# Patient Record
Sex: MALE | Race: WHITE | Employment: FULL TIME | URBAN - NONMETROPOLITAN AREA
[De-identification: names, ages, dates, MRNs, and addresses within clinical notes are randomized per-mention and may not be internally consistent; named-entity substitution may affect disease eponyms.]

---

## 2017-01-18 DIAGNOSIS — F17.200 SMOKER: Primary | ICD-10-CM

## 2017-01-18 RX ORDER — NICOTINE 21 MG/24HR
1 PATCH, TRANSDERMAL 24 HOURS TRANSDERMAL EVERY 24 HOURS
Qty: 30 PATCH | Refills: 1 | Status: SHIPPED | OUTPATIENT
Start: 2017-01-18 | End: 2017-11-09 | Stop reason: ALTCHOICE

## 2017-03-27 ENCOUNTER — TELEPHONE (OUTPATIENT)
Dept: FAMILY MEDICINE CLINIC | Age: 37
End: 2017-03-27

## 2017-03-28 ENCOUNTER — OFFICE VISIT (OUTPATIENT)
Dept: FAMILY MEDICINE CLINIC | Age: 37
End: 2017-03-28
Payer: COMMERCIAL

## 2017-03-28 VITALS
SYSTOLIC BLOOD PRESSURE: 120 MMHG | HEIGHT: 73 IN | HEART RATE: 60 BPM | BODY MASS INDEX: 23.99 KG/M2 | RESPIRATION RATE: 20 BRPM | WEIGHT: 181 LBS | DIASTOLIC BLOOD PRESSURE: 62 MMHG

## 2017-03-28 DIAGNOSIS — Z72.0 TOBACCO USE: Primary | ICD-10-CM

## 2017-03-28 DIAGNOSIS — E78.00 PURE HYPERCHOLESTEROLEMIA: ICD-10-CM

## 2017-03-28 DIAGNOSIS — K21.9 GASTROESOPHAGEAL REFLUX DISEASE WITHOUT ESOPHAGITIS: ICD-10-CM

## 2017-03-28 DIAGNOSIS — F41.8 ANXIETY ASSOCIATED WITH DEPRESSION: ICD-10-CM

## 2017-03-28 PROCEDURE — 99214 OFFICE O/P EST MOD 30 MIN: CPT | Performed by: PHYSICIAN ASSISTANT

## 2017-03-28 RX ORDER — NICOTINE 21 MG/24HR
1 PATCH, TRANSDERMAL 24 HOURS TRANSDERMAL EVERY 24 HOURS
Qty: 30 PATCH | Refills: 1 | Status: SHIPPED | OUTPATIENT
Start: 2017-03-28 | End: 2017-11-09 | Stop reason: ALTCHOICE

## 2017-03-28 ASSESSMENT — ENCOUNTER SYMPTOMS
COUGH: 0
RHINORRHEA: 1
GASTROINTESTINAL NEGATIVE: 1
SHORTNESS OF BREATH: 0

## 2017-03-31 DIAGNOSIS — E78.00 PURE HYPERCHOLESTEROLEMIA: ICD-10-CM

## 2017-04-04 RX ORDER — ATORVASTATIN CALCIUM 20 MG/1
20 TABLET, FILM COATED ORAL DAILY
Qty: 30 TABLET | Refills: 6 | Status: SHIPPED | OUTPATIENT
Start: 2017-04-04 | End: 2017-11-09 | Stop reason: SDUPTHER

## 2017-04-09 ASSESSMENT — ENCOUNTER SYMPTOMS
WHEEZING: 0
DIARRHEA: 0
CONSTIPATION: 0
CHEST TIGHTNESS: 0
TROUBLE SWALLOWING: 0
VOMITING: 0
SORE THROAT: 0
NAUSEA: 0

## 2017-04-29 DIAGNOSIS — K21.9 GASTROESOPHAGEAL REFLUX DISEASE WITHOUT ESOPHAGITIS: ICD-10-CM

## 2017-04-29 DIAGNOSIS — F41.8 DEPRESSION WITH ANXIETY: ICD-10-CM

## 2017-05-01 RX ORDER — OMEPRAZOLE 40 MG/1
CAPSULE, DELAYED RELEASE ORAL
Qty: 30 CAPSULE | Refills: 0 | Status: SHIPPED | OUTPATIENT
Start: 2017-05-01 | End: 2017-05-31 | Stop reason: SDUPTHER

## 2017-05-01 RX ORDER — ARIPIPRAZOLE 5 MG/1
TABLET ORAL
Qty: 30 TABLET | Refills: 0 | Status: SHIPPED | OUTPATIENT
Start: 2017-05-01 | End: 2017-05-31 | Stop reason: SDUPTHER

## 2017-05-31 DIAGNOSIS — K21.9 GASTROESOPHAGEAL REFLUX DISEASE WITHOUT ESOPHAGITIS: ICD-10-CM

## 2017-05-31 DIAGNOSIS — F41.8 DEPRESSION WITH ANXIETY: ICD-10-CM

## 2017-06-01 RX ORDER — OMEPRAZOLE 40 MG/1
CAPSULE, DELAYED RELEASE ORAL
Qty: 30 CAPSULE | Refills: 6 | Status: SHIPPED | OUTPATIENT
Start: 2017-06-01 | End: 2017-11-09 | Stop reason: SDUPTHER

## 2017-06-01 RX ORDER — ARIPIPRAZOLE 5 MG/1
TABLET ORAL
Qty: 30 TABLET | Refills: 6 | Status: SHIPPED | OUTPATIENT
Start: 2017-06-01 | End: 2017-11-09 | Stop reason: SDUPTHER

## 2017-06-30 ENCOUNTER — OFFICE VISIT (OUTPATIENT)
Dept: FAMILY MEDICINE CLINIC | Age: 37
End: 2017-06-30
Payer: COMMERCIAL

## 2017-06-30 VITALS
HEART RATE: 71 BPM | WEIGHT: 184 LBS | HEIGHT: 73 IN | OXYGEN SATURATION: 95 % | SYSTOLIC BLOOD PRESSURE: 112 MMHG | RESPIRATION RATE: 16 BRPM | DIASTOLIC BLOOD PRESSURE: 64 MMHG | BODY MASS INDEX: 24.39 KG/M2

## 2017-06-30 DIAGNOSIS — Z72.0 TOBACCO USE: Primary | ICD-10-CM

## 2017-06-30 DIAGNOSIS — B36.0 TINEA VERSICOLOR: ICD-10-CM

## 2017-06-30 PROCEDURE — 99213 OFFICE O/P EST LOW 20 MIN: CPT | Performed by: PHYSICIAN ASSISTANT

## 2017-06-30 RX ORDER — VARENICLINE TARTRATE 25 MG
KIT ORAL
Qty: 1 EACH | Refills: 0 | Status: SHIPPED | OUTPATIENT
Start: 2017-06-30 | End: 2017-11-09 | Stop reason: ALTCHOICE

## 2017-06-30 RX ORDER — TERBINAFINE HYDROCHLORIDE 250 MG/1
250 TABLET ORAL DAILY
Qty: 14 TABLET | Refills: 0 | Status: SHIPPED | OUTPATIENT
Start: 2017-06-30 | End: 2017-08-07 | Stop reason: SDUPTHER

## 2017-06-30 ASSESSMENT — PATIENT HEALTH QUESTIONNAIRE - PHQ9
2. FEELING DOWN, DEPRESSED OR HOPELESS: 0
SUM OF ALL RESPONSES TO PHQ9 QUESTIONS 1 & 2: 0
1. LITTLE INTEREST OR PLEASURE IN DOING THINGS: 0
SUM OF ALL RESPONSES TO PHQ QUESTIONS 1-9: 0

## 2017-07-02 ASSESSMENT — ENCOUNTER SYMPTOMS
COUGH: 0
SORE THROAT: 0
NAUSEA: 0
SHORTNESS OF BREATH: 0
WHEEZING: 0
TROUBLE SWALLOWING: 0
DIARRHEA: 0
VOMITING: 0
CHEST TIGHTNESS: 0

## 2017-08-01 DIAGNOSIS — B36.0 TINEA VERSICOLOR: ICD-10-CM

## 2017-08-07 DIAGNOSIS — B36.0 TINEA VERSICOLOR: ICD-10-CM

## 2017-08-07 RX ORDER — TERBINAFINE HYDROCHLORIDE 250 MG/1
250 TABLET ORAL DAILY
Qty: 14 TABLET | Refills: 0 | OUTPATIENT
Start: 2017-08-07

## 2017-08-07 RX ORDER — TERBINAFINE HYDROCHLORIDE 250 MG/1
250 TABLET ORAL DAILY
Qty: 14 TABLET | Refills: 0 | Status: SHIPPED | OUTPATIENT
Start: 2017-08-07 | End: 2017-11-09 | Stop reason: ALTCHOICE

## 2017-08-22 RX ORDER — VARENICLINE TARTRATE 1 MG/1
1 TABLET, FILM COATED ORAL 2 TIMES DAILY
Qty: 60 TABLET | Refills: 2 | Status: SHIPPED | OUTPATIENT
Start: 2017-08-22 | End: 2017-11-09 | Stop reason: ALTCHOICE

## 2017-11-09 ENCOUNTER — OFFICE VISIT (OUTPATIENT)
Dept: FAMILY MEDICINE CLINIC | Age: 37
End: 2017-11-09
Payer: COMMERCIAL

## 2017-11-09 VITALS
DIASTOLIC BLOOD PRESSURE: 70 MMHG | SYSTOLIC BLOOD PRESSURE: 100 MMHG | WEIGHT: 188 LBS | BODY MASS INDEX: 24.8 KG/M2 | HEART RATE: 81 BPM | OXYGEN SATURATION: 98 %

## 2017-11-09 DIAGNOSIS — Z72.0 TOBACCO USE: ICD-10-CM

## 2017-11-09 DIAGNOSIS — K21.9 GASTROESOPHAGEAL REFLUX DISEASE WITHOUT ESOPHAGITIS: ICD-10-CM

## 2017-11-09 DIAGNOSIS — F41.8 DEPRESSION WITH ANXIETY: ICD-10-CM

## 2017-11-09 DIAGNOSIS — E78.00 PURE HYPERCHOLESTEROLEMIA: ICD-10-CM

## 2017-11-09 DIAGNOSIS — B36.0 TINEA VERSICOLOR: Primary | ICD-10-CM

## 2017-11-09 PROCEDURE — 99214 OFFICE O/P EST MOD 30 MIN: CPT | Performed by: PHYSICIAN ASSISTANT

## 2017-11-09 RX ORDER — OMEPRAZOLE 40 MG/1
CAPSULE, DELAYED RELEASE ORAL
Qty: 30 CAPSULE | Refills: 6 | Status: SHIPPED | OUTPATIENT
Start: 2017-11-09 | End: 2019-02-08 | Stop reason: SDUPTHER

## 2017-11-09 RX ORDER — ARIPIPRAZOLE 5 MG/1
TABLET ORAL
Qty: 30 TABLET | Refills: 6 | Status: SHIPPED | OUTPATIENT
Start: 2017-11-09 | End: 2019-12-27

## 2017-11-09 RX ORDER — ATORVASTATIN CALCIUM 20 MG/1
20 TABLET, FILM COATED ORAL DAILY
Qty: 30 TABLET | Refills: 6 | Status: SHIPPED | OUTPATIENT
Start: 2017-11-09 | End: 2018-05-11 | Stop reason: SDUPTHER

## 2017-11-09 RX ORDER — BUPROPION HYDROCHLORIDE 150 MG/1
150 TABLET, EXTENDED RELEASE ORAL 2 TIMES DAILY
Qty: 60 TABLET | Refills: 3 | Status: SHIPPED | OUTPATIENT
Start: 2017-11-09 | End: 2018-11-07

## 2017-11-09 ASSESSMENT — ENCOUNTER SYMPTOMS
GASTROINTESTINAL NEGATIVE: 1
COUGH: 0
SHORTNESS OF BREATH: 0

## 2017-11-09 NOTE — PROGRESS NOTES
Subjective:      Patient ID: Neelam Bradley is a 40 y.o. male. Patient is seen following up on tobacco use. He did not get any help from the chantix. He took 2 packs and nothing. NicoDerm has not helped him. Was not interested in the Nicotrol inhaler. Needs refills on his medications. No recent illness. Does not want a flu shot. Wants to see dermatology for his tinea versicolor. The usual treatments have not helped this year. This really surprises him because he has responded to Lamisil in the past.        Review of Systems   Constitutional: Negative for appetite change, fatigue and fever. HENT: Negative. Respiratory: Negative for cough and shortness of breath. Cardiovascular: Negative. Gastrointestinal: Negative. Genitourinary: Negative. Negative for decreased urine volume. Musculoskeletal: Negative. Skin: Positive for rash. Neurological: Negative. Psychiatric/Behavioral: Negative for sleep disturbance. Mood is real good. Objective:   Physical Exam   Constitutional: He is oriented to person, place, and time. He appears well-developed and well-nourished. No distress. HENT:   Head: Normocephalic and atraumatic. Mouth/Throat: No oropharyngeal exudate. Eyes: Conjunctivae are normal. No scleral icterus. Neck: Normal range of motion. Neck supple. No thyromegaly present. Cardiovascular: Normal rate, regular rhythm and normal heart sounds. No murmur heard. Pulmonary/Chest: Effort normal and breath sounds normal. He has no wheezes. He has no rales. Abdominal: Soft. Bowel sounds are normal. He exhibits no distension and no mass. There is no tenderness. There is no rebound and no guarding. Musculoskeletal: He exhibits no edema. Lymphadenopathy:     He has no cervical adenopathy. Neurological: He is alert and oriented to person, place, and time. Skin: Skin is warm and dry. No rash noted. Psychiatric: He has a normal mood and affect.  His

## 2018-03-12 ENCOUNTER — OFFICE VISIT (OUTPATIENT)
Dept: DERMATOLOGY | Age: 38
End: 2018-03-12
Payer: COMMERCIAL

## 2018-03-12 VITALS
WEIGHT: 191 LBS | BODY MASS INDEX: 25.31 KG/M2 | DIASTOLIC BLOOD PRESSURE: 70 MMHG | RESPIRATION RATE: 14 BRPM | HEART RATE: 64 BPM | HEIGHT: 73 IN | SYSTOLIC BLOOD PRESSURE: 114 MMHG

## 2018-03-12 DIAGNOSIS — B36.0 TINEA VERSICOLOR: Primary | ICD-10-CM

## 2018-03-12 PROCEDURE — 99202 OFFICE O/P NEW SF 15 MIN: CPT | Performed by: DERMATOLOGY

## 2018-03-12 RX ORDER — FLUCONAZOLE 100 MG/1
TABLET ORAL
Qty: 4 TABLET | Refills: 0 | Status: SHIPPED | OUTPATIENT
Start: 2018-03-12 | End: 2018-05-11

## 2018-03-12 RX ORDER — KETOCONAZOLE 20 MG/ML
SHAMPOO TOPICAL
Qty: 120 ML | Refills: 11 | Status: SHIPPED | OUTPATIENT
Start: 2018-03-12 | End: 2019-03-11 | Stop reason: SDUPTHER

## 2018-03-12 NOTE — PATIENT INSTRUCTIONS
Flucanazole 200mg once, repeat dose in one week    ketocanazole use as a body wash    Follow up one year

## 2018-03-12 NOTE — PROGRESS NOTES
Dermatology Patient Note  [address]  [place] [address]  Dept: [phone]  Dept Fax: [phone]  Loc: [phone]      VISIT DATE: 3/12/2018   REFERRING PROVIDER: Teri Dandy, PA      Fanny Al is a 40 y.o. male  who presents today in the office for:    Rash (Patient has had a skin rash to his back and arms, has been present for a year and a half)      HISTORY OF PRESENT ILLNESS:  HPI Rash:    Tk Gomez was seen today for initial evaluation of Tinea Versicolor    Duration of Rash: years    Course: waxes and wanes    Areas of Involvement: trunk    Associated Symptoms: None    Exacerbating Factors: Sun Exposure     Current Medications for this Rash:  None     Previously Tried Medications: selsun blue, ketoconazole oral, ketoconazole cream and shampoo    Problem Specific Family Hx: No Contributory Family History      CURRENT MEDICATIONS:   Current Outpatient Prescriptions   Medication Sig Dispense Refill    fluconazole (DIFLUCAN) 100 MG tablet Take 200 mg once repeat in 1 week 4 tablet 0    ketoconazole (NIZORAL) 2 % shampoo Use as body wash leaving on for 5 minutes prior to washing off once daily for 2 weeks then three times weekly 120 mL 11    ARIPiprazole (ABILIFY) 5 MG tablet TAKE ONE TABLET BY MOUTH ONCE DAILY 30 tablet 6    atorvastatin (LIPITOR) 20 MG tablet Take 1 tablet by mouth daily 30 tablet 6    omeprazole (PRILOSEC) 40 MG delayed release capsule TAKE ONE CAPSULE BY MOUTH ONCE DAILY 30 capsule 6    buPROPion (WELLBUTRIN SR) 150 MG extended release tablet Take 1 tablet by mouth 2 times daily 60 tablet 3     No current facility-administered medications for this visit.         ALLERGIES:   No Known Allergies    SOCIAL HISTORY:  Social History   Substance Use Topics    Smoking status: Current Every Day Smoker     Packs/day: 0.25    Smokeless tobacco: Never Used      Comment: down to 5 cigarettes a day 03/21/2016    Alcohol use

## 2018-05-11 ENCOUNTER — OFFICE VISIT (OUTPATIENT)
Dept: FAMILY MEDICINE CLINIC | Age: 38
End: 2018-05-11
Payer: COMMERCIAL

## 2018-05-11 VITALS
OXYGEN SATURATION: 98 % | WEIGHT: 190 LBS | BODY MASS INDEX: 25.18 KG/M2 | HEIGHT: 73 IN | SYSTOLIC BLOOD PRESSURE: 118 MMHG | DIASTOLIC BLOOD PRESSURE: 78 MMHG | HEART RATE: 61 BPM

## 2018-05-11 DIAGNOSIS — K21.00 GASTROESOPHAGEAL REFLUX DISEASE WITH ESOPHAGITIS: Primary | ICD-10-CM

## 2018-05-11 DIAGNOSIS — Z72.0 TOBACCO USE: ICD-10-CM

## 2018-05-11 DIAGNOSIS — R94.5 ABNORMAL LIVER FUNCTION: ICD-10-CM

## 2018-05-11 DIAGNOSIS — F41.8 ANXIETY WITH DEPRESSION: ICD-10-CM

## 2018-05-11 DIAGNOSIS — E78.00 PURE HYPERCHOLESTEROLEMIA: ICD-10-CM

## 2018-05-11 DIAGNOSIS — B36.0 TINEA VERSICOLOR: ICD-10-CM

## 2018-05-11 PROCEDURE — 99214 OFFICE O/P EST MOD 30 MIN: CPT | Performed by: PHYSICIAN ASSISTANT

## 2018-05-11 RX ORDER — NICOTINE 21 MG/24HR
1 PATCH, TRANSDERMAL 24 HOURS TRANSDERMAL EVERY 24 HOURS
Qty: 30 PATCH | Refills: 3 | Status: SHIPPED | OUTPATIENT
Start: 2018-05-11 | End: 2019-05-11

## 2018-05-11 RX ORDER — ATORVASTATIN CALCIUM 20 MG/1
20 TABLET, FILM COATED ORAL DAILY
Qty: 30 TABLET | Refills: 6 | Status: SHIPPED | OUTPATIENT
Start: 2018-05-11 | End: 2019-02-08 | Stop reason: SDUPTHER

## 2018-05-11 RX ORDER — OMEPRAZOLE 40 MG/1
40 CAPSULE, DELAYED RELEASE ORAL DAILY
Qty: 30 CAPSULE | Refills: 5 | Status: SHIPPED | OUTPATIENT
Start: 2018-05-11 | End: 2019-12-27 | Stop reason: SDUPTHER

## 2018-05-11 RX ORDER — ARIPIPRAZOLE 5 MG/1
5 TABLET ORAL DAILY
Qty: 30 TABLET | Refills: 5 | Status: SHIPPED | OUTPATIENT
Start: 2018-05-11 | End: 2019-03-06 | Stop reason: SDUPTHER

## 2018-05-11 ASSESSMENT — PATIENT HEALTH QUESTIONNAIRE - PHQ9
SUM OF ALL RESPONSES TO PHQ9 QUESTIONS 1 & 2: 0
2. FEELING DOWN, DEPRESSED OR HOPELESS: 0
1. LITTLE INTEREST OR PLEASURE IN DOING THINGS: 0
SUM OF ALL RESPONSES TO PHQ QUESTIONS 1-9: 0

## 2018-05-11 ASSESSMENT — ENCOUNTER SYMPTOMS
DIARRHEA: 0
VOMITING: 0
RESPIRATORY NEGATIVE: 1

## 2018-05-16 ASSESSMENT — ENCOUNTER SYMPTOMS
ABDOMINAL DISTENTION: 0
TROUBLE SWALLOWING: 0
SINUS PRESSURE: 0
SHORTNESS OF BREATH: 0
COUGH: 0
RHINORRHEA: 0
SORE THROAT: 0
WHEEZING: 0
SINUS PAIN: 0
ABDOMINAL PAIN: 0

## 2018-06-19 ENCOUNTER — INITIAL CONSULT (OUTPATIENT)
Dept: SURGERY | Age: 38
End: 2018-06-19
Payer: COMMERCIAL

## 2018-06-19 VITALS
DIASTOLIC BLOOD PRESSURE: 80 MMHG | WEIGHT: 189 LBS | BODY MASS INDEX: 25.05 KG/M2 | HEIGHT: 73 IN | SYSTOLIC BLOOD PRESSURE: 110 MMHG | HEART RATE: 58 BPM

## 2018-06-19 DIAGNOSIS — K21.9 GASTROESOPHAGEAL REFLUX DISEASE, ESOPHAGITIS PRESENCE NOT SPECIFIED: Primary | ICD-10-CM

## 2018-06-19 PROCEDURE — 99213 OFFICE O/P EST LOW 20 MIN: CPT | Performed by: SURGERY

## 2018-07-30 ENCOUNTER — HOSPITAL ENCOUNTER (OUTPATIENT)
Age: 38
Setting detail: OUTPATIENT SURGERY
Discharge: HOME | End: 2018-07-30
Attending: SURGERY | Admitting: SURGERY
Payer: COMMERCIAL

## 2018-07-30 ENCOUNTER — ANESTHESIA (OUTPATIENT)
Dept: OPERATING ROOM | Age: 38
End: 2018-07-30
Payer: COMMERCIAL

## 2018-07-30 ENCOUNTER — ANESTHESIA EVENT (OUTPATIENT)
Dept: OPERATING ROOM | Age: 38
End: 2018-07-30
Payer: COMMERCIAL

## 2018-07-30 VITALS
TEMPERATURE: 97.5 F | WEIGHT: 183.4 LBS | SYSTOLIC BLOOD PRESSURE: 125 MMHG | RESPIRATION RATE: 16 BRPM | OXYGEN SATURATION: 99 % | HEART RATE: 52 BPM | DIASTOLIC BLOOD PRESSURE: 87 MMHG | BODY MASS INDEX: 24.31 KG/M2 | HEIGHT: 73 IN

## 2018-07-30 VITALS — DIASTOLIC BLOOD PRESSURE: 78 MMHG | SYSTOLIC BLOOD PRESSURE: 128 MMHG | OXYGEN SATURATION: 97 %

## 2018-07-30 PROCEDURE — 6360000002 HC RX W HCPCS: Performed by: NURSE ANESTHETIST, CERTIFIED REGISTERED

## 2018-07-30 PROCEDURE — 43239 EGD BIOPSY SINGLE/MULTIPLE: CPT | Performed by: SURGERY

## 2018-07-30 PROCEDURE — 2709999900 HC NON-CHARGEABLE SUPPLY: Performed by: SURGERY

## 2018-07-30 PROCEDURE — 2500000003 HC RX 250 WO HCPCS: Performed by: NURSE ANESTHETIST, CERTIFIED REGISTERED

## 2018-07-30 PROCEDURE — 00731 ANES UPR GI NDSC PX NOS: CPT | Performed by: NURSE ANESTHETIST, CERTIFIED REGISTERED

## 2018-07-30 PROCEDURE — 3700000000 HC ANESTHESIA ATTENDED CARE: Performed by: SURGERY

## 2018-07-30 PROCEDURE — 88305 TISSUE EXAM BY PATHOLOGIST: CPT

## 2018-07-30 PROCEDURE — 2580000003 HC RX 258: Performed by: SURGERY

## 2018-07-30 PROCEDURE — 7100000011 HC PHASE II RECOVERY - ADDTL 15 MIN: Performed by: SURGERY

## 2018-07-30 PROCEDURE — 3700000001 HC ADD 15 MINUTES (ANESTHESIA): Performed by: SURGERY

## 2018-07-30 PROCEDURE — 3609012400 HC EGD TRANSORAL BIOPSY SINGLE/MULTIPLE: Performed by: SURGERY

## 2018-07-30 PROCEDURE — 7100000010 HC PHASE II RECOVERY - FIRST 15 MIN: Performed by: SURGERY

## 2018-07-30 RX ORDER — LIDOCAINE HYDROCHLORIDE 40 MG/ML
INJECTION, SOLUTION RETROBULBAR; TOPICAL PRN
Status: DISCONTINUED | OUTPATIENT
Start: 2018-07-30 | End: 2018-07-30 | Stop reason: SDUPTHER

## 2018-07-30 RX ORDER — MIDAZOLAM HYDROCHLORIDE 1 MG/ML
INJECTION INTRAMUSCULAR; INTRAVENOUS PRN
Status: DISCONTINUED | OUTPATIENT
Start: 2018-07-30 | End: 2018-07-30 | Stop reason: SDUPTHER

## 2018-07-30 RX ORDER — PROPOFOL 10 MG/ML
INJECTION, EMULSION INTRAVENOUS PRN
Status: DISCONTINUED | OUTPATIENT
Start: 2018-07-30 | End: 2018-07-30 | Stop reason: SDUPTHER

## 2018-07-30 RX ORDER — SODIUM CHLORIDE, SODIUM LACTATE, POTASSIUM CHLORIDE, CALCIUM CHLORIDE 600; 310; 30; 20 MG/100ML; MG/100ML; MG/100ML; MG/100ML
INJECTION, SOLUTION INTRAVENOUS CONTINUOUS
Status: DISCONTINUED | OUTPATIENT
Start: 2018-07-30 | End: 2018-07-30 | Stop reason: HOSPADM

## 2018-07-30 RX ADMIN — LIDOCAINE HYDROCHLORIDE 200 MG: 40 INJECTION, SOLUTION RETROBULBAR; TOPICAL at 11:12

## 2018-07-30 RX ADMIN — PROPOFOL 350 MG: 10 INJECTION, EMULSION INTRAVENOUS at 11:12

## 2018-07-30 RX ADMIN — MIDAZOLAM HYDROCHLORIDE 2 MG: 1 INJECTION, SOLUTION INTRAMUSCULAR; INTRAVENOUS at 11:12

## 2018-07-30 RX ADMIN — SODIUM CHLORIDE, SODIUM LACTATE, POTASSIUM CHLORIDE, AND CALCIUM CHLORIDE: .6; .31; .03; .02 INJECTION, SOLUTION INTRAVENOUS at 09:34

## 2018-07-30 ASSESSMENT — LIFESTYLE VARIABLES: SMOKING_STATUS: 1

## 2018-07-30 ASSESSMENT — PAIN SCALES - GENERAL
PAINLEVEL_OUTOF10: 0

## 2018-07-30 ASSESSMENT — PAIN - FUNCTIONAL ASSESSMENT: PAIN_FUNCTIONAL_ASSESSMENT: 0-10

## 2018-07-30 NOTE — H&P
Leopoldo Buddle is a 40 y.o. male              CC:         Chief Complaint   Patient presents with    Gastroesophageal Reflux         HISTORY OF PRESENT ILLNESS:     EGD  Nausea: no  Vomiting: no  Heartburn:only if misses Prilosec  Dysphagia:no  Hematemesis:no  Epigastric pain:occasional  Anemia: no  Previous work up date:no previous EGD  Current Treatment:Prilosec 40mg daily        Patient is a 51-year-old male who has had GERD for several years. He has been on Prilosec 40 mg once a day for the last 3 years. He has tried going off of it several times and he gets immediate heartburn. If he takes the medicine. He doesn't have many problems. His he has primary care physician and she felt that he needed to have an EGD for her baseline.               Review of Systems:     General:  Fever: Negative  Weight Change:Negative  Night Sweats: Negative     Eye:  Blurry Vision:Negative  Double Vision: Negative     Ent:  Headaches: Negative  Sore throat: Negative     Allergy/Immunology:  Hives: Negative  Latex allergy: Negative     Hematology/Lymphatic:  Bleeding Problems: Negative  Blood Clots: Negative  Swollen Lymph Nodes: Negative     Lungs:  Cough: Negative  SOB: Negative  Wheezing:Negative     Cardiovascular:  Chest Pain: Negative  Palpitations:Negative     GI:   Decreased Appetite: Negative  Heartburn: Positive  Dysphagia: Negative  Nausea/Vomiting: Negative  Abdominal Pain: Negative  Change in Bowels:Negative  Constipation: Negative  Diarrhea: Negative  Rectal Bleeding: positive for occasional     :   Dysuria: Negative  Increase Urinary Frequency/Urgency: Negative     Neuro:  Seizures: Negative  Confusion: Negative           PAST MEDICAL HISTORY:        Family History          Family History   Problem Relation Age of Onset    Mental Illness Mother         bipolar    Heart Disease Father         Cabg twice    High Blood Pressure Father      Diabetes Father      Mental Illness Sister         manic tablet by mouth daily 30 tablet 5    ketoconazole (NIZORAL) 2 % shampoo Use as body wash leaving on for 5 minutes prior to washing off once daily for 2 weeks then three times weekly 120 mL 11    ARIPiprazole (ABILIFY) 5 MG tablet TAKE ONE TABLET BY MOUTH ONCE DAILY 30 tablet 6    omeprazole (PRILOSEC) 40 MG delayed release capsule TAKE ONE CAPSULE BY MOUTH ONCE DAILY 30 capsule 6    buPROPion (WELLBUTRIN SR) 150 MG extended release tablet Take 1 tablet by mouth 2 times daily 60 tablet 3    nicotine (NICODERM CQ) 21 MG/24HR Place 1 patch onto the skin every 24 hours 30 patch 3      No current facility-administered medications on file prior to visit. Allergies as of 06/19/2018    (No Known Allergies)          PHYSICAL EXAM:    Blood pressure 128/68, pulse 60, temperature 97.9 °F (36.6 °C), temperature source Temporal, resp. rate 16, height 6' 1\" (1.854 m), weight 183 lb 6.4 oz (83.2 kg), SpO2 99 %. Gen:  A and O x 3, NAD, well nourished  Eyes:  Sclera non icterus, PERRL  Lungs:  CTA, symmetrical  Chest:  RRR, no murmurs  Abd:  Soft, NT, ND, no HSM, no bruits     ASSESS MENT:     1.  Long term GERD on PPI,   2.   No previous EGD        PLAN:     Set up for EGD

## 2018-07-31 LAB — SURGICAL PATHOLOGY REPORT: NORMAL

## 2018-08-06 ENCOUNTER — TELEPHONE (OUTPATIENT)
Dept: SURGERY | Age: 38
End: 2018-08-06

## 2018-11-07 ENCOUNTER — OFFICE VISIT (OUTPATIENT)
Dept: FAMILY MEDICINE CLINIC | Age: 38
End: 2018-11-07
Payer: COMMERCIAL

## 2018-11-07 ENCOUNTER — HOSPITAL ENCOUNTER (OUTPATIENT)
Dept: LAB | Age: 38
Discharge: HOME | End: 2018-11-07
Payer: COMMERCIAL

## 2018-11-07 VITALS
HEIGHT: 74 IN | WEIGHT: 191 LBS | HEART RATE: 99 BPM | SYSTOLIC BLOOD PRESSURE: 110 MMHG | OXYGEN SATURATION: 96 % | BODY MASS INDEX: 24.51 KG/M2 | DIASTOLIC BLOOD PRESSURE: 70 MMHG

## 2018-11-07 DIAGNOSIS — J01.41 ACUTE RECURRENT PANSINUSITIS: ICD-10-CM

## 2018-11-07 DIAGNOSIS — R94.5 ABNORMAL LIVER FUNCTION: ICD-10-CM

## 2018-11-07 DIAGNOSIS — B36.0 TINEA VERSICOLOR: Primary | ICD-10-CM

## 2018-11-07 DIAGNOSIS — R05.9 COUGH: ICD-10-CM

## 2018-11-07 DIAGNOSIS — E78.00 PURE HYPERCHOLESTEROLEMIA: ICD-10-CM

## 2018-11-07 DIAGNOSIS — J20.9 ACUTE BRONCHITIS, UNSPECIFIED ORGANISM: ICD-10-CM

## 2018-11-07 DIAGNOSIS — F41.8 ANXIETY WITH DEPRESSION: ICD-10-CM

## 2018-11-07 LAB
ALBUMIN SERPL-MCNC: 4.7 G/DL (ref 3.5–5.2)
ALBUMIN/GLOBULIN RATIO: 1.6 (ref 1–2.5)
ALP BLD-CCNC: 66 U/L (ref 40–129)
ALT SERPL-CCNC: 21 U/L (ref 5–41)
ANION GAP SERPL CALCULATED.3IONS-SCNC: 11 MMOL/L (ref 9–17)
AST SERPL-CCNC: 21 U/L
BILIRUB SERPL-MCNC: 0.35 MG/DL (ref 0.3–1.2)
BUN BLDV-MCNC: 18 MG/DL (ref 6–20)
BUN/CREAT BLD: 18 (ref 9–20)
CALCIUM SERPL-MCNC: 9.5 MG/DL (ref 8.6–10.4)
CHLORIDE BLD-SCNC: 98 MMOL/L (ref 98–107)
CO2: 29 MMOL/L (ref 20–31)
CREAT SERPL-MCNC: 0.98 MG/DL (ref 0.7–1.2)
GFR AFRICAN AMERICAN: >60 ML/MIN
GFR NON-AFRICAN AMERICAN: >60 ML/MIN
GFR SERPL CREATININE-BSD FRML MDRD: NORMAL ML/MIN/{1.73_M2}
GFR SERPL CREATININE-BSD FRML MDRD: NORMAL ML/MIN/{1.73_M2}
GLUCOSE BLD-MCNC: 89 MG/DL (ref 70–99)
POTASSIUM SERPL-SCNC: 3.7 MMOL/L (ref 3.7–5.3)
SODIUM BLD-SCNC: 138 MMOL/L (ref 135–144)
TOTAL PROTEIN: 7.6 G/DL (ref 6.4–8.3)

## 2018-11-07 PROCEDURE — 80053 COMPREHEN METABOLIC PANEL: CPT

## 2018-11-07 PROCEDURE — 36415 COLL VENOUS BLD VENIPUNCTURE: CPT

## 2018-11-07 PROCEDURE — 99214 OFFICE O/P EST MOD 30 MIN: CPT | Performed by: PHYSICIAN ASSISTANT

## 2018-11-07 RX ORDER — BENZONATATE 200 MG/1
200 CAPSULE ORAL 3 TIMES DAILY PRN
Qty: 30 CAPSULE | Refills: 0 | Status: SHIPPED | OUTPATIENT
Start: 2018-11-07 | End: 2018-11-14

## 2018-11-07 RX ORDER — FLUCONAZOLE 200 MG/1
TABLET ORAL
Qty: 2 TABLET | Refills: 2 | Status: SHIPPED | OUTPATIENT
Start: 2018-11-07 | End: 2019-05-09 | Stop reason: ALTCHOICE

## 2018-11-07 RX ORDER — AZITHROMYCIN 250 MG/1
TABLET, FILM COATED ORAL
Qty: 1 PACKET | Refills: 1 | Status: SHIPPED | OUTPATIENT
Start: 2018-11-07 | End: 2018-11-11

## 2018-11-07 ASSESSMENT — ENCOUNTER SYMPTOMS
SINUS PAIN: 0
SHORTNESS OF BREATH: 0
CHEST TIGHTNESS: 0
COUGH: 1
ANAL BLEEDING: 1
WHEEZING: 1
SINUS PRESSURE: 0
CONSTIPATION: 0
BLOOD IN STOOL: 0
DIARRHEA: 0
NAUSEA: 0
VOMITING: 0

## 2018-11-07 NOTE — PROGRESS NOTES
distension and no mass. There is no tenderness. Musculoskeletal: He exhibits no edema. Lymphadenopathy:     He has no cervical adenopathy. Neurological: He is alert and oriented to person, place, and time. Skin: Skin is warm and dry. No rash noted. There is erythema. [place] colored rash noted on the upper back and anterior chest.   Psychiatric: He has a normal mood and affect. Nursing note and vitals reviewed. Assessment:       Diagnosis Orders   1. Tinea versicolor  fluconazole (DIFLUCAN) 200 MG tablet   2. Cough  XR CHEST STANDARD (2 VW)    benzonatate (TESSALON) 200 MG capsule   3. Acute recurrent pansinusitis  azithromycin (ZITHROMAX Z-JAKOB) 250 MG tablet   4. Acute bronchitis, unspecified organism  azithromycin (ZITHROMAX Z-JAKOB) 250 MG tablet    benzonatate (TESSALON) 200 MG capsule   5. Anxiety with depression     6. Pure hypercholesterolemia             Plan:      Fluids NSAIDs rest  Over-the-counter agents when necessary  Follow-up 6 months sooner if problems  Tobacco cessation strongly encouraged  Recommended using tea tree shampoo or Selsun Blue 2-3 times a week  Answered all his questions  Coping mechanisms  He will be notified of all results  Further treatment based on results  Lifestyle changes discussed    Samir Anderson received counseling on the following healthy behaviors: nutrition, exercise, medication adherence and tobacco cessation    Patient given educational materials on Hyperlipidemia, Smoking Cessation, Nutrition and Exercise    I have instructed Samir Anderson to complete a self tracking handout on Blood Pressures , Weights and Smoking and instructed them to bring it with them to his next appointment. Discussed use, benefit, and side effects of prescribed medications. Barriers to medication compliance addressed. All patient questions answered. Pt voiced understanding.            MAURICE Green

## 2019-02-08 DIAGNOSIS — E78.00 PURE HYPERCHOLESTEROLEMIA: ICD-10-CM

## 2019-02-08 DIAGNOSIS — K21.9 GASTROESOPHAGEAL REFLUX DISEASE WITHOUT ESOPHAGITIS: ICD-10-CM

## 2019-02-08 RX ORDER — OMEPRAZOLE 40 MG/1
CAPSULE, DELAYED RELEASE ORAL
Qty: 30 CAPSULE | Refills: 6 | Status: SHIPPED | OUTPATIENT
Start: 2019-02-08 | End: 2019-03-11 | Stop reason: SDUPTHER

## 2019-02-08 RX ORDER — ATORVASTATIN CALCIUM 20 MG/1
20 TABLET, FILM COATED ORAL DAILY
Qty: 30 TABLET | Refills: 6 | Status: SHIPPED | OUTPATIENT
Start: 2019-02-08 | End: 2019-11-12 | Stop reason: SDUPTHER

## 2019-03-06 DIAGNOSIS — F41.8 ANXIETY WITH DEPRESSION: ICD-10-CM

## 2019-03-06 RX ORDER — ARIPIPRAZOLE 5 MG/1
5 TABLET ORAL DAILY
Qty: 30 TABLET | Refills: 5 | Status: SHIPPED | OUTPATIENT
Start: 2019-03-06 | End: 2019-03-11 | Stop reason: SDUPTHER

## 2019-03-11 ENCOUNTER — OFFICE VISIT (OUTPATIENT)
Dept: DERMATOLOGY | Age: 39
End: 2019-03-11
Payer: COMMERCIAL

## 2019-03-11 VITALS
BODY MASS INDEX: 25.47 KG/M2 | SYSTOLIC BLOOD PRESSURE: 110 MMHG | HEIGHT: 73 IN | HEART RATE: 68 BPM | DIASTOLIC BLOOD PRESSURE: 62 MMHG | WEIGHT: 192.2 LBS

## 2019-03-11 DIAGNOSIS — B36.0 TINEA VERSICOLOR: Primary | ICD-10-CM

## 2019-03-11 PROCEDURE — 99213 OFFICE O/P EST LOW 20 MIN: CPT | Performed by: DERMATOLOGY

## 2019-03-11 RX ORDER — KETOCONAZOLE 20 MG/ML
SHAMPOO TOPICAL
Qty: 120 ML | Refills: 11 | Status: SHIPPED | OUTPATIENT
Start: 2019-03-11 | End: 2019-11-12 | Stop reason: SDUPTHER

## 2019-05-09 ENCOUNTER — OFFICE VISIT (OUTPATIENT)
Dept: FAMILY MEDICINE CLINIC | Age: 39
End: 2019-05-09
Payer: COMMERCIAL

## 2019-05-09 VITALS
BODY MASS INDEX: 24.77 KG/M2 | SYSTOLIC BLOOD PRESSURE: 112 MMHG | DIASTOLIC BLOOD PRESSURE: 74 MMHG | HEIGHT: 74 IN | WEIGHT: 193 LBS | HEART RATE: 73 BPM | OXYGEN SATURATION: 98 % | RESPIRATION RATE: 14 BRPM

## 2019-05-09 DIAGNOSIS — Z72.0 TOBACCO USE: ICD-10-CM

## 2019-05-09 DIAGNOSIS — M25.521 RIGHT ELBOW PAIN: ICD-10-CM

## 2019-05-09 DIAGNOSIS — K21.9 GASTROESOPHAGEAL REFLUX DISEASE WITHOUT ESOPHAGITIS: ICD-10-CM

## 2019-05-09 DIAGNOSIS — M19.90 ARTHRITIS: ICD-10-CM

## 2019-05-09 DIAGNOSIS — F41.8 ANXIETY WITH DEPRESSION: ICD-10-CM

## 2019-05-09 DIAGNOSIS — M25.561 ARTHRALGIA OF BOTH KNEES: Primary | ICD-10-CM

## 2019-05-09 DIAGNOSIS — E78.00 PURE HYPERCHOLESTEROLEMIA: ICD-10-CM

## 2019-05-09 DIAGNOSIS — M25.562 ARTHRALGIA OF BOTH KNEES: Primary | ICD-10-CM

## 2019-05-09 PROCEDURE — 99214 OFFICE O/P EST MOD 30 MIN: CPT | Performed by: PHYSICIAN ASSISTANT

## 2019-05-09 ASSESSMENT — PATIENT HEALTH QUESTIONNAIRE - PHQ9
SUM OF ALL RESPONSES TO PHQ QUESTIONS 1-9: 0
2. FEELING DOWN, DEPRESSED OR HOPELESS: 0
SUM OF ALL RESPONSES TO PHQ9 QUESTIONS 1 & 2: 0
SUM OF ALL RESPONSES TO PHQ QUESTIONS 1-9: 0
1. LITTLE INTEREST OR PLEASURE IN DOING THINGS: 0

## 2019-05-09 ASSESSMENT — ENCOUNTER SYMPTOMS
VOMITING: 0
DIARRHEA: 0
RESPIRATORY NEGATIVE: 1
NAUSEA: 0

## 2019-05-09 NOTE — PROGRESS NOTES
[facility]    Subjective:      Patient ID: Avani Heredia is a 45 y.o. y.o. male. Patient is seen due to elbows and knees. Ankles some and hips not as bad. Mom has fibromyalgia. No crippling arthritis. His elbows really hurt feel weak. Normally does not take anything. Noticing this pain more recently. No recent illness. No falls. Is right handed. For his job is .          Past Medical History:   Diagnosis Date    Alcohol abuse     Anxiety state, unspecified     Depressive disorder, not elsewhere classified     GERD (gastroesophageal reflux disease)     Hemorrhoid     History of hand fracture May 2010    proximal shaft of right 5th metacarpal    History of marijuana use     Tinea versicolor     recurrent    Tobacco abuse        Past Surgical History:   Procedure Laterality Date    OTHER SURGICAL HISTORY Left September 2010    thumb tenorrhaphy extensor tendon laceration, Dr. Rory Ott N/A 7/30/2018    EGD BIOPSY performed by Mandy Gilbert MD at [address] [place] [address]      5/15    WISDOM TOOTH EXTRACTION      x2       Family History   Problem Relation Age of Onset    Mental Illness Mother         bipolar    Heart Disease Father         Cabg twice    High Blood Pressure Father     Diabetes Father     Mental Illness Sister         manic depressive bipolar disease    Mental Illness Maternal Grandmother         manic depressive    Heart Disease Paternal Grandfather         had a pig valve/CAD       No Known Allergies    Current Outpatient Medications   Medication Sig Dispense Refill    ketoconazole (NIZORAL) 2 % shampoo Use as body wash leaving on for 5 minutes prior to washing off once daily for 2 weeks then three times weekly 120 mL 11    atorvastatin (LIPITOR) 20 MG tablet Take 1 tablet by mouth daily 30 tablet 6    omeprazole (PRILOSEC) 40 MG delayed release capsule Take 1 capsule by mouth daily 30 capsule 5    nicotine appears well-developed and well-nourished. No distress. HENT:   Head: Normocephalic and atraumatic. Nose: Nose normal.   Mouth/Throat: No oropharyngeal exudate. Eyes: Conjunctivae are normal. No scleral icterus. Neck: Normal range of motion. Neck supple. No thyromegaly present. Cardiovascular: Normal rate, regular rhythm, normal heart sounds and intact distal pulses. Exam reveals no gallop and no friction rub. No murmur heard. Pulmonary/Chest: Effort normal and breath sounds normal. He has no wheezes. He has no rales. Abdominal: Soft. Bowel sounds are normal. He exhibits no distension and no mass. There is no tenderness. There is no rebound and no guarding. No hernia. Musculoskeletal: Normal range of motion. He exhibits tenderness. He exhibits no edema. He has significant pain with palpation over the lateral epicondyles bilaterally in the elbows. NL ROM but has pain with flexion extension of the elbows. Pain with pronation and supination in the lateral elbows. No redness or warmth or bruising noted. No real swelling noted in the knees bilaterally. No warmth or crepitus noted. No pain with varus valgus and no laxity. Drawer negative. No significant pain with flexion extension at the knees. Lymphadenopathy:     He has no cervical adenopathy. Neurological: He is alert and oriented to person, place, and time. He exhibits normal muscle tone. Has a nl gait. Nl strength and sensation upper and  lower extremities bilaterally. Skin: Skin is warm and dry. Capillary refill takes less than 2 seconds. No rash noted. No erythema. No pallor. Psychiatric: He has a normal mood and affect. Nursing note and vitals reviewed. Assessment & Plan:      Diagnosis Orders   1. Arthralgia of both knees  Sedimentation Rate    MINERVA Screen With Reflex    Rheumatoid Factor    Vitamin D 25 Hydroxy   2.  Arthritis  Sedimentation Rate    MINERVA Screen With Reflex    Rheumatoid Factor    Vitamin D 25 Hydroxy   3. Pure hypercholesterolemia  Comprehensive Metabolic Panel    Lipid, Fasting    TSH With Reflex Ft4   4. Right elbow pain  XR ELBOW RIGHT (MIN 3 VIEWS)   5. Anxiety with depression     6. Tobacco use     7. Gastroesophageal reflux disease without esophagitis         Stop lipitor for 2 weeks let me know how he is doing if it helps any of his joint pain if not go back on it  Lifestyle changes  Answered his questions  Good nutrition hydration  Follow up 2 weeks sooner if problems  HEP discussed with patient  Heat ice to affected areas  biofreeze    Tinaa Rodriguez received counseling on the following healthy behaviors: nutrition, exercise, medication adherence and tobacco cessation    Patient given educational materials on Hyperlipidemia, Smoking Cessation, Nutrition and Exercise    I have instructed Tiana Rodriguez to complete a self tracking handout on Blood Sugars , Blood Pressures  and Weights and instructed them to bring it with them to his next appointment. Discussed use, benefit, and side effects of prescribed medications. Barriers to medication compliance addressed. All patient questions answered. Pt voiced understanding.          MAURICE Hall  5/9/2019 4:26 PM    (Pleasenote that portions of this note were completed with a voice recognition program.Efforts were made to edit the dictations but occasionally words are mis-transcribed.)

## 2019-05-22 ASSESSMENT — ENCOUNTER SYMPTOMS
SINUS PAIN: 0
COUGH: 0
SHORTNESS OF BREATH: 0
SINUS PRESSURE: 0
RHINORRHEA: 0
SORE THROAT: 0
TROUBLE SWALLOWING: 0
WHEEZING: 0
CHEST TIGHTNESS: 0

## 2019-06-25 ENCOUNTER — HOSPITAL ENCOUNTER (OUTPATIENT)
Dept: LAB | Age: 39
Discharge: HOME | End: 2019-06-25
Payer: COMMERCIAL

## 2019-06-25 ENCOUNTER — OFFICE VISIT (OUTPATIENT)
Dept: FAMILY MEDICINE CLINIC | Age: 39
End: 2019-06-25
Payer: COMMERCIAL

## 2019-06-25 VITALS
OXYGEN SATURATION: 98 % | RESPIRATION RATE: 16 BRPM | WEIGHT: 185 LBS | SYSTOLIC BLOOD PRESSURE: 104 MMHG | DIASTOLIC BLOOD PRESSURE: 72 MMHG | BODY MASS INDEX: 23.74 KG/M2 | HEART RATE: 60 BPM | HEIGHT: 74 IN

## 2019-06-25 DIAGNOSIS — M25.50 ARTHRALGIA, UNSPECIFIED JOINT: Primary | ICD-10-CM

## 2019-06-25 DIAGNOSIS — E78.00 PURE HYPERCHOLESTEROLEMIA: Primary | ICD-10-CM

## 2019-06-25 DIAGNOSIS — M25.50 ARTHRALGIA, UNSPECIFIED JOINT: ICD-10-CM

## 2019-06-25 DIAGNOSIS — M25.562 ARTHRALGIA OF BOTH KNEES: ICD-10-CM

## 2019-06-25 DIAGNOSIS — E78.00 PURE HYPERCHOLESTEROLEMIA: ICD-10-CM

## 2019-06-25 DIAGNOSIS — M25.561 ARTHRALGIA OF BOTH KNEES: ICD-10-CM

## 2019-06-25 DIAGNOSIS — M19.90 ARTHRITIS: ICD-10-CM

## 2019-06-25 LAB
ALBUMIN SERPL-MCNC: 5.2 G/DL (ref 3.5–5.2)
ALBUMIN/GLOBULIN RATIO: 1.7 (ref 1–2.5)
ALP BLD-CCNC: 57 U/L (ref 40–129)
ALT SERPL-CCNC: 27 U/L (ref 5–41)
ANION GAP SERPL CALCULATED.3IONS-SCNC: 10 MMOL/L (ref 9–17)
AST SERPL-CCNC: 29 U/L
BILIRUB SERPL-MCNC: 0.53 MG/DL (ref 0.3–1.2)
BUN BLDV-MCNC: 10 MG/DL (ref 6–20)
BUN/CREAT BLD: 12 (ref 9–20)
C-REACTIVE PROTEIN: 1.1 MG/L (ref 0–5)
CALCIUM SERPL-MCNC: 10 MG/DL (ref 8.6–10.4)
CHLORIDE BLD-SCNC: 100 MMOL/L (ref 98–107)
CHOLESTEROL, FASTING: 264 MG/DL
CHOLESTEROL/HDL RATIO: 4.3
CO2: 29 MMOL/L (ref 20–31)
CREAT SERPL-MCNC: 0.85 MG/DL (ref 0.7–1.2)
GFR AFRICAN AMERICAN: >60 ML/MIN
GFR NON-AFRICAN AMERICAN: >60 ML/MIN
GFR SERPL CREATININE-BSD FRML MDRD: ABNORMAL ML/MIN/{1.73_M2}
GFR SERPL CREATININE-BSD FRML MDRD: ABNORMAL ML/MIN/{1.73_M2}
GLUCOSE BLD-MCNC: 114 MG/DL (ref 70–99)
HDLC SERPL-MCNC: 62 MG/DL
LDL CHOLESTEROL: 173 MG/DL (ref 0–130)
POTASSIUM SERPL-SCNC: 4.6 MMOL/L (ref 3.7–5.3)
RHEUMATOID FACTOR: 13 IU/ML
SEDIMENTATION RATE, ERYTHROCYTE: 1 MM (ref 0–15)
SODIUM BLD-SCNC: 139 MMOL/L (ref 135–144)
TOTAL PROTEIN: 8.2 G/DL (ref 6.4–8.3)
TRIGLYCERIDE, FASTING: 143 MG/DL
TSH SERPL DL<=0.05 MIU/L-ACNC: 1.23 MIU/L (ref 0.3–5)
VITAMIN D 25-HYDROXY: 25.8 NG/ML (ref 30–100)
VLDLC SERPL CALC-MCNC: ABNORMAL MG/DL (ref 1–30)

## 2019-06-25 PROCEDURE — 85651 RBC SED RATE NONAUTOMATED: CPT

## 2019-06-25 PROCEDURE — 86431 RHEUMATOID FACTOR QUANT: CPT

## 2019-06-25 PROCEDURE — 36415 COLL VENOUS BLD VENIPUNCTURE: CPT

## 2019-06-25 PROCEDURE — 84443 ASSAY THYROID STIM HORMONE: CPT

## 2019-06-25 PROCEDURE — 99213 OFFICE O/P EST LOW 20 MIN: CPT | Performed by: PHYSICIAN ASSISTANT

## 2019-06-25 PROCEDURE — 86038 ANTINUCLEAR ANTIBODIES: CPT

## 2019-06-25 PROCEDURE — 86140 C-REACTIVE PROTEIN: CPT

## 2019-06-25 PROCEDURE — 80061 LIPID PANEL: CPT

## 2019-06-25 PROCEDURE — 82306 VITAMIN D 25 HYDROXY: CPT

## 2019-06-25 PROCEDURE — 80053 COMPREHEN METABOLIC PANEL: CPT

## 2019-06-25 PROCEDURE — 86235 NUCLEAR ANTIGEN ANTIBODY: CPT

## 2019-06-25 PROCEDURE — 86225 DNA ANTIBODY NATIVE: CPT

## 2019-06-25 RX ORDER — ROSUVASTATIN CALCIUM 5 MG/1
5 TABLET, COATED ORAL NIGHTLY
Qty: 30 TABLET | Refills: 3 | Status: SHIPPED | OUTPATIENT
Start: 2019-06-25 | End: 2019-12-27 | Stop reason: SDUPTHER

## 2019-06-25 ASSESSMENT — PATIENT HEALTH QUESTIONNAIRE - PHQ9
2. FEELING DOWN, DEPRESSED OR HOPELESS: 0
SUM OF ALL RESPONSES TO PHQ QUESTIONS 1-9: 0
1. LITTLE INTEREST OR PLEASURE IN DOING THINGS: 0
SUM OF ALL RESPONSES TO PHQ9 QUESTIONS 1 & 2: 0
SUM OF ALL RESPONSES TO PHQ QUESTIONS 1-9: 0

## 2019-06-25 ASSESSMENT — ENCOUNTER SYMPTOMS
SHORTNESS OF BREATH: 0
COUGH: 0
NAUSEA: 0
DIARRHEA: 0
VOMITING: 0

## 2019-06-25 NOTE — PROGRESS NOTES
Subjective:      Patient ID: Nicole Humphreys is a 45 y.o. male. Patient is seen following up on his joint pain today and recent lab work. Since stopping the lipitor he has not had any pain in the knees or elbows. Feels so much better. Wondering what we should do and if he needs to be on medication for his lipids. He feels good otherwise and no new concerns. Review of Systems   Constitutional: Negative for appetite change, chills, fatigue and fever. HENT: Negative. Negative for congestion. Respiratory: Negative for cough and shortness of breath. Cardiovascular: Negative for chest pain and palpitations. Gastrointestinal: Negative for diarrhea, nausea and vomiting. Genitourinary: Negative for difficulty urinating. Musculoskeletal: Negative for arthralgias, gait problem and myalgias. Skin: Negative for rash. Neurological: Negative for weakness, light-headedness, numbness and headaches. Psychiatric/Behavioral: Negative for agitation and sleep disturbance. The patient is not nervous/anxious.       Past Medical History:   Diagnosis Date    Alcohol abuse     Anxiety state, unspecified     Depressive disorder, not elsewhere classified     GERD (gastroesophageal reflux disease)     Hemorrhoid     History of hand fracture May 2010    proximal shaft of right 5th metacarpal    History of marijuana use     Tinea versicolor     recurrent    Tobacco abuse      Past Surgical History:   Procedure Laterality Date    OTHER SURGICAL HISTORY Left September 2010    thumb tenorrhaphy extensor tendon laceration, Dr. [facility] ENDOSCOPY N/A 7/30/2018    EGD BIOPSY performed by Enid Zuniga MD at / [place] [address]      5/15    WISDOM TOOTH EXTRACTION      x2     Social History     Socioeconomic History    Marital status:      Spouse name: Not on file    Number of children: Not on file    Years of education: Not on file    Highest education level: Not on file Occupational History    Not on file   Social Needs    Financial resource strain: Not on file    Food insecurity:     Worry: Not on file     Inability: Not on file    Transportation needs:     Medical: Not on file     Non-medical: Not on file   Tobacco Use    Smoking status: Current Every Day Smoker     Packs/day: 1.00    Smokeless tobacco: Never Used    Tobacco comment: down to 5 cigarettes a day 03/21/2016   Substance and Sexual Activity    Alcohol use:  Yes     Alcohol/week: 1.2 oz     Types: 2 Cans of beer per week     Comment: cut back 3 months ago doesn't even drink daily now    Drug use: Yes     Types: Marijuana     Comment: everyday 1/8 ounce a week or one joint a day    Sexual activity: Not on file   Lifestyle    Physical activity:     Days per week: Not on file     Minutes per session: Not on file    Stress: Not on file   Relationships    Social connections:     Talks on phone: Not on file     Gets together: Not on file     Attends Jehovah's witness service: Not on file     Active member of club or organization: Not on file     Attends meetings of clubs or organizations: Not on file     Relationship status: Not on file    Intimate partner violence:     Fear of current or ex partner: Not on file     Emotionally abused: Not on file     Physically abused: Not on file     Forced sexual activity: Not on file   Other Topics Concern    Not on file   Social History Narrative    Not on file     Family History   Problem Relation Age of Onset    Mental Illness Mother         bipolar    Heart Disease Father         Cabg twice    High Blood Pressure Father     Diabetes Father     Mental Illness Sister         manic depressive bipolar disease    Mental Illness Maternal Grandmother         manic depressive    Heart Disease Paternal Grandfather         had a pig valve/CAD       No Known Allergies    /72   Pulse 60   Resp 16   Ht 6' 2\" (1.88 m)   Wt 185 lb (83.9 kg)   SpO2 98%   BMI 23.75 kg/m²

## 2019-06-26 DIAGNOSIS — M25.50 ARTHRALGIA, UNSPECIFIED JOINT: ICD-10-CM

## 2019-06-26 DIAGNOSIS — R89.9 ABNORMAL LABORATORY TEST: Primary | ICD-10-CM

## 2019-06-26 LAB
ANA REFERENCE RANGE:: ABNORMAL
ANTI DNA DOUBLE STRANDED: 231 IU/ML
ANTI JO-1 IGG: 61 U/ML
ANTI RNP AB: 254 U/ML
ANTI SSA: 8 U/ML
ANTI SSB: 14 U/ML
ANTI-CENTROMERE: 10 U/ML
ANTI-NUCLEAR ANTIBODY (ANA): POSITIVE
ANTI-SCLERODERMA: 48 U/ML
ANTI-SMITH: 23 U/ML
HISTONE ANTIBODY: 16 U/ML

## 2019-09-08 DIAGNOSIS — K21.9 GASTROESOPHAGEAL REFLUX DISEASE WITHOUT ESOPHAGITIS: ICD-10-CM

## 2019-09-08 DIAGNOSIS — F41.8 ANXIETY WITH DEPRESSION: ICD-10-CM

## 2019-09-09 RX ORDER — ARIPIPRAZOLE 5 MG/1
TABLET ORAL
Qty: 30 TABLET | Refills: 5 | Status: SHIPPED | OUTPATIENT
Start: 2019-09-09 | End: 2019-12-27 | Stop reason: SDUPTHER

## 2019-09-09 RX ORDER — OMEPRAZOLE 40 MG/1
CAPSULE, DELAYED RELEASE ORAL
Qty: 30 CAPSULE | Refills: 6 | Status: SHIPPED | OUTPATIENT
Start: 2019-09-09

## 2019-09-09 NOTE — TELEPHONE ENCOUNTER
Patient calling stating that he is completely out of this medication at this time and needs this to be filled today.

## 2019-11-11 DIAGNOSIS — E78.00 PURE HYPERCHOLESTEROLEMIA: ICD-10-CM

## 2019-11-12 RX ORDER — ATORVASTATIN CALCIUM 20 MG/1
20 TABLET, FILM COATED ORAL DAILY
Qty: 30 TABLET | Refills: 6 | Status: SHIPPED | OUTPATIENT
Start: 2019-11-12 | End: 2019-12-27 | Stop reason: ALTCHOICE

## 2019-11-12 RX ORDER — KETOCONAZOLE 20 MG/ML
SHAMPOO TOPICAL
Qty: 120 ML | Refills: 11 | Status: SHIPPED | OUTPATIENT
Start: 2019-11-12 | End: 2020-03-09 | Stop reason: SDUPTHER

## 2019-12-27 ENCOUNTER — OFFICE VISIT (OUTPATIENT)
Dept: FAMILY MEDICINE CLINIC | Age: 39
End: 2019-12-27
Payer: COMMERCIAL

## 2019-12-27 VITALS
WEIGHT: 190.8 LBS | HEIGHT: 74 IN | RESPIRATION RATE: 14 BRPM | HEART RATE: 77 BPM | DIASTOLIC BLOOD PRESSURE: 60 MMHG | SYSTOLIC BLOOD PRESSURE: 104 MMHG | OXYGEN SATURATION: 97 % | BODY MASS INDEX: 24.49 KG/M2

## 2019-12-27 DIAGNOSIS — K21.00 GASTROESOPHAGEAL REFLUX DISEASE WITH ESOPHAGITIS: ICD-10-CM

## 2019-12-27 DIAGNOSIS — F41.8 ANXIETY WITH DEPRESSION: ICD-10-CM

## 2019-12-27 DIAGNOSIS — E78.00 PURE HYPERCHOLESTEROLEMIA: ICD-10-CM

## 2019-12-27 DIAGNOSIS — Z72.0 TOBACCO USE: Primary | ICD-10-CM

## 2019-12-27 PROCEDURE — 99214 OFFICE O/P EST MOD 30 MIN: CPT | Performed by: PHYSICIAN ASSISTANT

## 2019-12-27 RX ORDER — OMEPRAZOLE 40 MG/1
40 CAPSULE, DELAYED RELEASE ORAL DAILY
Qty: 90 CAPSULE | Refills: 2 | Status: SHIPPED | OUTPATIENT
Start: 2019-12-27

## 2019-12-27 RX ORDER — ROSUVASTATIN CALCIUM 5 MG/1
5 TABLET, COATED ORAL NIGHTLY
Qty: 90 TABLET | Refills: 2 | Status: SHIPPED | OUTPATIENT
Start: 2019-12-27 | End: 2020-03-10 | Stop reason: SDUPTHER

## 2019-12-27 RX ORDER — ARIPIPRAZOLE 5 MG/1
TABLET ORAL
Qty: 90 TABLET | Refills: 2 | Status: SHIPPED | OUTPATIENT
Start: 2019-12-27 | End: 2020-03-10 | Stop reason: SDUPTHER

## 2019-12-27 ASSESSMENT — ENCOUNTER SYMPTOMS
CHEST TIGHTNESS: 0
COUGH: 0
EYES NEGATIVE: 1
RESPIRATORY NEGATIVE: 1
DIARRHEA: 0
NAUSEA: 0
VOMITING: 0

## 2019-12-27 ASSESSMENT — PATIENT HEALTH QUESTIONNAIRE - PHQ9
2. FEELING DOWN, DEPRESSED OR HOPELESS: 0
SUM OF ALL RESPONSES TO PHQ9 QUESTIONS 1 & 2: 0
SUM OF ALL RESPONSES TO PHQ QUESTIONS 1-9: 0
1. LITTLE INTEREST OR PLEASURE IN DOING THINGS: 0
SUM OF ALL RESPONSES TO PHQ QUESTIONS 1-9: 0

## 2020-03-09 ENCOUNTER — OFFICE VISIT (OUTPATIENT)
Dept: DERMATOLOGY | Age: 40
End: 2020-03-09
Payer: COMMERCIAL

## 2020-03-09 VITALS
DIASTOLIC BLOOD PRESSURE: 74 MMHG | WEIGHT: 189 LBS | SYSTOLIC BLOOD PRESSURE: 124 MMHG | HEIGHT: 74 IN | BODY MASS INDEX: 24.26 KG/M2

## 2020-03-09 PROCEDURE — 99214 OFFICE O/P EST MOD 30 MIN: CPT | Performed by: DERMATOLOGY

## 2020-03-09 RX ORDER — CLINDAMYCIN PHOSPHATE 10 UG/ML
LOTION TOPICAL
Qty: 60 ML | Refills: 11 | Status: SHIPPED | OUTPATIENT
Start: 2020-03-09 | End: 2021-10-19

## 2020-03-09 RX ORDER — KETOCONAZOLE 20 MG/ML
SHAMPOO TOPICAL
Qty: 120 ML | Refills: 11 | Status: SHIPPED | OUTPATIENT
Start: 2020-03-09 | End: 2021-10-19

## 2020-03-09 NOTE — PROGRESS NOTES
Dermatology Patient Note  [address]  [address]  Dept: [phone]  Dept Fax: [phone]: [phone]      VISIT DATE: 3/9/2020   REFERRING PROVIDER: No ref. provider found      Manoj Bill is a 44 y.o. male  who presents today in the office for:    Rash (tinea versicolor improving ) and Tinea Pedis (has noticed increase itching burning and skin changes in feet)      HISTORY OF PRESENT ILLNESS:  HPI Rash Followup:    Charisse Barksdale was seen today for follow-up evaluation of Tinea Versicolor    Interim Course: quiescent    Areas of Involvement: trunk    Associated Symptoms: None    Exacerbating Factors: sweating    Current Medications for this Rash:  ketoconazole     Rash Treatment Compliance:  Using all Topical Medications as Prescribed at Last Visit    Side Effects from Treatments: None    Interim  Evaluation: None    Also new concern of rash on feet that burns that does not respond to OTC athletes foot creams      CURRENT MEDICATIONS:   Current Outpatient Medications   Medication Sig Dispense Refill    ketoconazole (NIZORAL) 2 % shampoo Use as body wash leaving on for 5 minutes prior to washing off once daily for 2 weeks then three times weekly 120 mL 11    clindamycin (CLEOCIN T) 1 % lotion Apply to feet twice daily 60 mL 11    rosuvastatin (CRESTOR) 5 MG tablet Take 1 tablet by mouth nightly 90 tablet 2    ARIPiprazole (ABILIFY) 5 MG tablet TAKE 1 TABLET BY MOUTH ONCE DAILY 90 tablet 2    omeprazole (PRILOSEC) 40 MG delayed release capsule TAKE 1 CAPSULE BY MOUTH ONCE DAILY 30 capsule 6    omeprazole (PRILOSEC) 40 MG delayed release capsule Take 1 capsule by mouth daily (Patient not taking: Reported on 3/9/2020) 90 capsule 2    nicotine (NICODERM CQ) 21 MG/24HR Place 1 patch onto the skin every 24 hours 30 patch 3     No current facility-administered medications for this visit. ALLERGIES:   No Known Allergies    SOCIAL HISTORY:  Social History     Tobacco Use    Smoking status: Current Every Day Smoker     Packs/day: 1.00    Smokeless tobacco: Never Used    Tobacco comment: down to 5 cigarettes a day 03/21/2016   Substance Use Topics    Alcohol use: Yes     Alcohol/week: 2.0 standard drinks     Types: 2 Cans of beer per week     Comment: cut back 3 months ago doesn't even drink daily now       REVIEW OF SYSTEMS:  Review of Systems   Constitutional: Negative. Skin:Denies any new changing, growing or bleeding lesions or rashes except as described in the HPI     PHYSICAL EXAM:   /74   Ht 6' 2\" (1.88 m)   Wt 189 lb (85.7 kg)   BMI 24.27 kg/m²     General Exam:  General Appearance: No acute distress, Well nourished     Neuro: Alert and oriented to person, place and time  Psych: Normal affect   Lymph Node: Not performed    Cutaneous Exam: Performed as documented in clinic note below. Waist-up skin, whichincludes the head/face, neck, both arms, chest, back, abdomen, digits and/or nails, was examined. Plus feet    Pertinent Physical Exam Findings:  Physical Exam  Skin:     Comments: Chest and back clear    Pitted erosions between toes and on heels         Medical Necessity of Exam Performed:   Distribution of patient concerns    Additional Diagnostic Testing performed during exam: Not performed ,  Not performed    ASSESSMENT:   Diagnosis Orders   1. Pitted keratolysis     2. Tinea versicolor         Plan of Action is as Follows:  Assessment 1. Pitted keratolysis  - New problem  - Clindamycin lotion BID  - White Vinegar soaks x 2 weeks    2.  Tinea versicolor  - Chronic problem - well controlled  - Continue ketoconazole shampoo 3-4 times weekly as body wash          Photo surveillance performed: No    Follow-up: 1 year    This note was created with the assistance of aspeech-recognition program.  Although the intention is to generate a document that actually reflects thecontent of the visit, no guarantees can be provided that every mistake has been identified and corrected by editing.     Electronically signed by Isabella Smith MD on 3/9/20 at 8:41 AM EDT

## 2020-03-10 RX ORDER — ARIPIPRAZOLE 5 MG/1
TABLET ORAL
Qty: 90 TABLET | Refills: 2 | Status: SHIPPED | OUTPATIENT
Start: 2020-03-10

## 2020-03-10 RX ORDER — ROSUVASTATIN CALCIUM 5 MG/1
5 TABLET, COATED ORAL NIGHTLY
Qty: 90 TABLET | Refills: 2 | Status: SHIPPED | OUTPATIENT
Start: 2020-03-10

## 2021-10-06 ENCOUNTER — TELEPHONE (OUTPATIENT)
Dept: DERMATOLOGY | Age: 41
End: 2021-10-06

## 2021-10-06 NOTE — TELEPHONE ENCOUNTER
Patient called the office requesting a refill. Patient would like a refill for clindamycin 1% lotion sent to [facility] in [address] [place] [address] [address] Patient has an appointment with Dr Alie Norris 12/27/2021 - former [facility] [address] patient.   Any questions call back # [phone]

## 2021-10-07 NOTE — TELEPHONE ENCOUNTER
Notified patient and is very upset since we had to reschedule his appt from Dr. Pramod Camops to Dr Kishan Romeo. Pt asked if he could do a virtual visit. Writer gave [employer] number to see if that could be set up.

## 2021-10-19 ENCOUNTER — VIRTUAL VISIT (OUTPATIENT)
Dept: DERMATOLOGY | Age: 41
End: 2021-10-19
Payer: COMMERCIAL

## 2021-10-19 DIAGNOSIS — B36.0 TINEA VERSICOLOR: ICD-10-CM

## 2021-10-19 DIAGNOSIS — L08.89 PITTED KERATOLYSIS: Primary | ICD-10-CM

## 2021-10-19 PROCEDURE — 99213 OFFICE O/P EST LOW 20 MIN: CPT | Performed by: DERMATOLOGY

## 2021-10-19 RX ORDER — CLINDAMYCIN PHOSPHATE 10 UG/ML
LOTION TOPICAL
Qty: 60 ML | Refills: 11 | Status: SHIPPED | OUTPATIENT
Start: 2021-10-19

## 2021-10-19 RX ORDER — KETOCONAZOLE 20 MG/ML
SHAMPOO TOPICAL
Qty: 120 ML | Refills: 11 | Status: SHIPPED | OUTPATIENT
Start: 2021-10-19

## 2021-10-19 NOTE — PROGRESS NOTES
TELEHEALTH EVALUATION -- Audio/Visual (During ICZCQ-15 public health emergency)    Dermatology Patient Note  [facility] [address]  [address]  Dept: [phone]  Dept Fax: [phone]      VISITDATE: 10/19/2021   REFERRING PROVIDER: No ref. provider found      Aminata Kang is a 39 y.o. male  who presents today in the office for:    No chief complaint on file. PERTINENT HISTORY NOT LISTED ABOVE:  Patient previously seen by Dr. 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 with regular use of ketoconazole shampoo    CURRENT MEDICATIONS:   Current Outpatient Medications   Medication Sig Dispense Refill    clindamycin (CLEOCIN T) 1 % lotion Apply to affected areas on feet daily 60 mL 11    ketoconazole (NIZORAL) 2 % shampoo Use as body wash leaving on for 5 minutes prior to washing off once daily for 2 weeks then three times weekly 120 mL 11    ARIPiprazole (ABILIFY) 5 MG tablet TAKE 1 TABLET BY MOUTH ONCE DAILY 90 tablet 2    rosuvastatin (CRESTOR) 5 MG tablet Take 1 tablet by mouth nightly 90 tablet 2    omeprazole (PRILOSEC) 40 MG delayed release capsule Take 1 capsule by mouth daily (Patient not taking: Reported on 3/9/2020) 90 capsule 2    omeprazole (PRILOSEC) 40 MG delayed release capsule TAKE 1 CAPSULE BY MOUTH ONCE DAILY 30 capsule 6    nicotine (NICODERM CQ) 21 MG/24HR Place 1 patch onto the skin every 24 hours 30 patch 3     No current facility-administered medications for this visit.        ALLERGIES:   No Known Allergies    SOCIAL HISTORY:  Social History     Tobacco Use    Smoking status: Current Every Day Smoker     Packs/day: 1.00    Smokeless tobacco: Never Used    Tobacco comment: down to 5 cigarettes a day 03/21/2016   Substance Use Topics    Alcohol use: Yes     Alcohol/week: 2.0 standard drinks     Types: 2 Cans of beer per week     Comment: cut back 3 months ago doesn't even drink daily now       Pertinent ROS:  Review of Systems  Skin: Denies any new changing, growing or bleeding lesions or rashes except as described in the HPI   Constitutional: Denies fevers, chills, and malaise. PHYSICAL EXAM:     Due to this being a TeleHealth encounter, evaluation of the following organ systems is limited: Vitals/Constitutional/EENT/Resp/CV/GI//MS/Neuro/Skin/Heme-Lymph-Imm. In particular examination of the skin is limited by video quality and patient available technology. There were no vitals taken for this visit. The patient is generally well appearing, well nourished, alert and conversational. Affect is normal.    Cutaneous Exam:  Physical Exam  Face and neck only were examined    Diagnoses/exam findings/medical history pertinent to this visit are listed below:    Assessment and Plan:  Assessment   1. Pitted keratolysis  - recurrent  - clindamycin (CLEOCIN T) 1 % lotion; Apply to affected areas on feet daily  Dispense: 60 mL; Refill: 11    2. Tinea versicolor  - clear, stable  - ketoconazole (NIZORAL) 2 % shampoo; Use as body wash leaving on for 5 minutes prior to washing off once daily for 2 weeks then three times weekly  Dispense: 120 mL; Refill: 11          RTC 1 year    There are no Patient Instructions on file for this visit. This note was created with the assistance of a speech-recognition program.  Although the intention is to generate a document that actually reflects the content of the visit, no guarantees can be provided that every mistake has been identified and corrected byediting.     Pursuant to the emergency declaration under the [facility], [facility] authority and the [employer] Act, this Virtual  Visit was conducted, with patient's consent, to reduce the patient's risk of exposure to COVID-19 and provide continuity of care for an established patient. Services were provided through a video synchronous discussion virtually to substitute for in-person clinic visit.      Electronically signed by Chandrika Flowers MD on 10/19/21 at 12:32 PM EDT

## 2021-12-27 ENCOUNTER — OFFICE VISIT (OUTPATIENT)
Dept: DERMATOLOGY | Age: 41
End: 2021-12-27
Payer: COMMERCIAL

## 2021-12-27 VITALS
BODY MASS INDEX: 25.15 KG/M2 | HEIGHT: 74 IN | DIASTOLIC BLOOD PRESSURE: 64 MMHG | WEIGHT: 196 LBS | HEART RATE: 71 BPM | SYSTOLIC BLOOD PRESSURE: 118 MMHG

## 2021-12-27 DIAGNOSIS — B35.3 TINEA PEDIS OF BOTH FEET: ICD-10-CM

## 2021-12-27 DIAGNOSIS — L08.89 PITTED KERATOLYSIS: Primary | ICD-10-CM

## 2021-12-27 PROCEDURE — 99213 OFFICE O/P EST LOW 20 MIN: CPT | Performed by: DERMATOLOGY

## 2021-12-27 RX ORDER — CLOTRIMAZOLE 1 %
CREAM (GRAM) TOPICAL
Qty: 24 G | Refills: 5 | Status: SHIPPED | OUTPATIENT
Start: 2021-12-27 | End: 2022-01-03

## 2021-12-27 NOTE — PROGRESS NOTES
Dermatology Patient Note  [facility] [address] [address]  [address]  Dept: [phone]  Dept Fax: [phone]: [phone]      VISITDATE: 12/27/2021   REFERRING PROVIDER: No ref. provider found      Deshawn Mora is a 39 y.o. male  who presents today in the office for:    Follow-up (yearly pitted keratolyis tinea versacolor, back and bilateral feet)      HISTORY OF PRESENT ILLNESS:  As above. Patient states that feet are improving. MEDICAL PROBLEMS:  Patient Active Problem List    Diagnosis Date Noted    Hyperlipidemia LDL goal <130 02/17/2016    Exophthalmia 02/17/2016    GERD (gastroesophageal reflux disease)     Tobacco abuse     Tinea versicolor      recurrent      Anxiety state     Depressive disorder, not elsewhere classified     Alcohol abuse     History of marijuana use     History of hand fracture 05/01/2010     proximal shaft of right 5th metacarpal         CURRENT MEDICATIONS:   Current Outpatient Medications   Medication Sig Dispense Refill    clindamycin (CLEOCIN T) 1 % lotion Apply to affected areas on feet daily 60 mL 11    ketoconazole (NIZORAL) 2 % shampoo Use as body wash leaving on for 5 minutes prior to washing off once daily for 2 weeks then three times weekly 120 mL 11    ARIPiprazole (ABILIFY) 5 MG tablet TAKE 1 TABLET BY MOUTH ONCE DAILY 90 tablet 2    rosuvastatin (CRESTOR) 5 MG tablet Take 1 tablet by mouth nightly 90 tablet 2    omeprazole (PRILOSEC) 40 MG delayed release capsule Take 1 capsule by mouth daily 90 capsule 2    omeprazole (PRILOSEC) 40 MG delayed release capsule TAKE 1 CAPSULE BY MOUTH ONCE DAILY 30 capsule 6    nicotine (NICODERM CQ) 21 MG/24HR Place 1 patch onto the skin every 24 hours 30 patch 3     No current facility-administered medications for this visit.        ALLERGIES:   No Known Allergies    SOCIAL HISTORY:  Social History     Tobacco Use  Smoking status: Current Every Day Smoker     Packs/day: 1.00    Smokeless tobacco: Never Used    Tobacco comment: down to 5 cigarettes a day 03/21/2016   Substance Use Topics    Alcohol use: Yes     Alcohol/week: 2.0 standard drinks     Types: 2 Cans of beer per week     Comment: cut back 3 months ago doesn't even drink daily now       Pertinent ROS:  Review of Systems  Skin: Denies any new changing, growing or bleeding lesions or rashes except as described in the HPI   Constitutional: Denies fevers, chills, and malaise. PHYSICAL EXAM:   /64   Pulse 71   Ht 6' 2\" (1.88 m)   Wt 196 lb (88.9 kg)   BMI 25.16 kg/m²     The patient is generally well appearing, well nourished, alert and conversational. Affect is normal.    Cutaneous Exam:  Physical Exam  Focused exam of feet was performed    Facial covering was not removed during examination. Diagnoses/exam findings/medical history pertinent to this visit are listed below:    Assessment:   Diagnosis Orders   1. Pitted keratolysis          Plan:  Pitted keratolysis  - chronic illness, responding to treatment but not yet at goal  - add clotrimazole for possible tinea of webspaces  - continue clindamycin lotion daily    RTC 1 year     Future Appointments   Date Time Provider Murphy Wetzel   10/19/2022  8:00 AM Davina Moralez MD  derm [facility]         There are no Patient Instructions on file for this visit. This note was created with the assistance of a speech-recognition program.  Although the intention is to generate a document that actually reflects the content of the visit, no guarantees can be provided that every mistake has been identified and corrected by editing. I, Dr. Kiki Navarro, personally performed the services described in this documentation, as scribed by [facility] in my presence, and it is both accurate and complete.      Electronically signed by Davina Moralez MD on 12/27/21 at 8:44 AM EST

## 2022-01-27 ENCOUNTER — HOSPITAL ENCOUNTER (OUTPATIENT)
Dept: GENERAL RADIOLOGY | Age: 42
Discharge: HOME | End: 2022-01-29
Payer: COMMERCIAL

## 2022-01-27 ENCOUNTER — OFFICE VISIT (OUTPATIENT)
Dept: PODIATRY | Age: 42
End: 2022-01-27
Payer: COMMERCIAL

## 2022-01-27 VITALS
SYSTOLIC BLOOD PRESSURE: 126 MMHG | RESPIRATION RATE: 20 BRPM | HEART RATE: 76 BPM | WEIGHT: 199.8 LBS | BODY MASS INDEX: 25.65 KG/M2 | DIASTOLIC BLOOD PRESSURE: 70 MMHG

## 2022-01-27 DIAGNOSIS — M79.672 LEFT FOOT PAIN: Primary | ICD-10-CM

## 2022-01-27 DIAGNOSIS — M20.42 HAMMER TOE OF LEFT FOOT: ICD-10-CM

## 2022-01-27 DIAGNOSIS — L60.8 DEFORMITY OF NAIL BED: ICD-10-CM

## 2022-01-27 DIAGNOSIS — M79.672 LEFT FOOT PAIN: ICD-10-CM

## 2022-01-27 PROCEDURE — 73630 X-RAY EXAM OF FOOT: CPT

## 2022-01-27 PROCEDURE — 99203 OFFICE O/P NEW LOW 30 MIN: CPT | Performed by: PODIATRIST

## 2022-01-27 NOTE — PROGRESS NOTES
Subjective:  Graham Norton is a 39 y.o. male who presents to the office today complaining of a hammertoe deformity Left . Symptoms began  month(s) ago. Patient relates pain is Absent . Pain is rated 0 out of 10 and is described as none. Patient states his father has left foot issues and is worried how much this would progress. Treatments prior to today's visit include: none. Currently denies F/C/N/V. No Known Allergies    Past Medical History:   Diagnosis Date    Alcohol abuse     Anxiety state, unspecified     Depressive disorder, not elsewhere classified     GERD (gastroesophageal reflux disease)     Hemorrhoid     History of hand fracture May 2010    proximal shaft of right 5th metacarpal    History of marijuana use     Tinea versicolor     recurrent    Tobacco abuse        Prior to Admission medications    Medication Sig Start Date End Date Taking?  Authorizing Provider   clindamycin (CLEOCIN T) 1 % lotion Apply to affected areas on feet daily 10/19/21  Yes Ralph Garcia MD   ketoconazole (NIZORAL) 2 % shampoo Use as body wash leaving on for 5 minutes prior to washing off once daily for 2 weeks then three times weekly 10/19/21  Yes Ralph Garcia MD   ARIPiprazole (ABILIFY) 5 MG tablet TAKE 1 TABLET BY MOUTH ONCE DAILY 3/10/20  Yes MAURICE Peñaloza   rosuvastatin (CRESTOR) 5 MG tablet Take 1 tablet by mouth nightly 3/10/20  Yes MAURICE Peñaloza   omeprazole (PRILOSEC) 40 MG delayed release capsule Take 1 capsule by mouth daily 12/27/19  Yes MAURICE Peñaloza   omeprazole (PRILOSEC) 40 MG delayed release capsule TAKE 1 CAPSULE BY MOUTH ONCE DAILY 9/9/19  Yes MAURICE Peñaloza   nicotine (NICODERM CQ) 21 MG/24HR Place 1 patch onto the skin every 24 hours 5/11/18 5/11/19  MAURICE Peñaloza       Past Surgical History:   Procedure Laterality Date    OTHER SURGICAL HISTORY Left September 2010    thumb tenorrhaphy extensor tendon laceration, Dr. Emil Teresa UPPER GASTROINTESTINAL ENDOSCOPY N/A 7/30/2018    EGD BIOPSY performed by Siddharth Euceda MD at [address] [place] [address] Road      5/15    WISDOM TOOTH EXTRACTION      x2       Family History   Problem Relation Age of Onset    Mental Illness Mother         bipolar    Heart Disease Father         Cabg twice    High Blood Pressure Father     Diabetes Father     Mental Illness Sister         manic depressive bipolar disease    Mental Illness Maternal Grandmother         manic depressive    Heart Disease Paternal Grandfather         had a pig valve/CAD       Social History     Tobacco Use    Smoking status: Current Every Day Smoker     Packs/day: 1.00    Smokeless tobacco: Never Used    Tobacco comment: down to 5 cigarettes a day 03/21/2016   Substance Use Topics    Alcohol use: Yes     Alcohol/week: 2.0 standard drinks     Types: 2 Cans of beer per week     Comment: cut back 3 months ago doesn't even drink daily now       ROS: All 14 ROS systems reviewed and pertinent positives noted above, all others negative. Lower Extremity Physical Examination:     Vitals:   Vitals:    01/27/22 0757   BP: 126/70   Pulse: 76   Resp: 20     General: AAO x 3 in NAD. Vascular: DP and PT pulses palpable 2/4, bilateral.  CFT <3 seconds, bilateral.  Hair growth present to the level of the digits, bilateral.  Edema absent, bilateral.  Varicosities absent, bilateral. Erythema absent, bilateral. Distal Rubor absent bilateral.  Temperature within normal limits bilateral. Hyperpigmentation absent bilateral. No atrophic skin. Neurological: Sensation intact to light touch to level of digits, bilateral.  Protective sensation intact 10/10 sites via 5.07/10g [place] Monofilament, bilateral.  negative Tinel's, bilateral.  negative Valleix sign, bilateral.  Vibratory intact bilateral.  Reflexes Decreased bilateral.  Paresthesias negative. Dysthesias negative.   Sharp/dull intact bilateral.    Musculoskeletal: Muscle strength 5/5, bilateral.  Pain absent upon palpation of above mentioned hammerote. within normal limits medial longitudinal arch, Bilateral.  Ankle ROM within normal limits,Bilateral.  1st MPJ ROM within normal limits, Bilateral.  Dorsally contracted digits present digits 2, 3, Left. Slight dorsal contracture only the left second toe slight lateral deviation applying the pressure to the medial left third toe. Negative Lachman's test.  Other foot deformities none. Integument: Warm, dry, supple, bilateral.  Open lesion absent, bilateral.  Interdigital maceration absent to web spaces bilateral.  Nails left 3 dystrophic with central groove from the pressure of the second toe. Fissures absent, bilateral. Hyperkeratotic tissue is absent. Asessment: Patient is a 39 y.o. male with:    Diagnosis Orders   1. Left foot pain  XR FOOT LEFT (MIN 3 VIEWS)   2. Hammer toe of left foot     3. Deformity of nail bed         Plan: Patient examined and evaluated. Current condition and treatment options discussed in detail. Appropriate shoe gear with room in toe box discussed. Options give for offloading pads (silipos) to wear while in shoe gear. Orders Placed This Encounter   Procedures    XR FOOT LEFT (MIN 3 VIEWS)     Standing Status:   Future     Standing Expiration Date:   1/27/2023     Scheduling Instructions:      WEIGHT BEARING     Order Specific Question:   Reason for exam:     Answer:   foot pain   Patient advised to watch for any blistering callus increased pain or ingrown nail situations. Patient low level medical decision making. Patient has acute uncomplicated condition along with 1 new test ordered. Low risk of morbidity overall. Further recommendations post x-rays. Rule underlying bone abnormality as a source of toe position versus soft tissue only. Contact office with any questions/problems/concerns. RTC in 3week(s).

## 2022-11-28 ENCOUNTER — OFFICE VISIT (OUTPATIENT)
Dept: DERMATOLOGY | Age: 42
End: 2022-11-28
Payer: COMMERCIAL

## 2022-11-28 VITALS
WEIGHT: 194 LBS | DIASTOLIC BLOOD PRESSURE: 70 MMHG | BODY MASS INDEX: 24.9 KG/M2 | HEIGHT: 74 IN | SYSTOLIC BLOOD PRESSURE: 128 MMHG | HEART RATE: 76 BPM

## 2022-11-28 DIAGNOSIS — L08.89 PITTED KERATOLYSIS: Primary | ICD-10-CM

## 2022-11-28 DIAGNOSIS — B35.3 TINEA PEDIS OF BOTH FEET: ICD-10-CM

## 2022-11-28 DIAGNOSIS — B35.1 ONYCHOMYCOSIS: ICD-10-CM

## 2022-11-28 PROCEDURE — 99213 OFFICE O/P EST LOW 20 MIN: CPT | Performed by: PHYSICIAN ASSISTANT

## 2022-11-28 RX ORDER — CLINDAMYCIN PHOSPHATE 10 UG/ML
LOTION TOPICAL
Qty: 60 ML | Refills: 11 | Status: SHIPPED | OUTPATIENT
Start: 2022-11-28

## 2022-11-28 RX ORDER — KETOCONAZOLE 20 MG/G
CREAM TOPICAL
Qty: 60 G | Refills: 2 | Status: SHIPPED | OUTPATIENT
Start: 2022-11-28

## 2022-11-28 RX ORDER — UREA 40 %
CREAM (GRAM) TOPICAL
Qty: 198 G | Refills: 2 | Status: SHIPPED | OUTPATIENT
Start: 2022-11-28

## 2022-11-28 NOTE — PROGRESS NOTES
Dermatology Patient Note  [address] [address]  [address] [facility] AND SKIN A DEPARTMENT OF [address]  [address]  Dept: [phone]  Dept Fax: [phone]      VISITDATE: 11/28/2022   REFERRING PROVIDER: MAURICE Rene      Geoff Swann is a 43 y.o. male  who presents today in the office for:    Other (Pitted keratolysis 1 year follow up/Refill clindamycin)      HISTORY OF PRESENT ILLNESS:  Feet are doing well with clindamycin lotion. Pt is beginning to notice discoloration of toenails and c/o maceration/pruritus in toe web spaces. MEDICAL PROBLEMS:  Patient Active Problem List    Diagnosis Date Noted    Hyperlipidemia LDL goal <130 02/17/2016    Exophthalmia 02/17/2016    GERD (gastroesophageal reflux disease)     Tobacco abuse     Tinea versicolor      recurrent      Anxiety state     Depressive disorder, not elsewhere classified     Alcohol abuse     History of marijuana use     History of hand fracture 05/01/2010     proximal shaft of right 5th metacarpal         CURRENT MEDICATIONS:   Current Outpatient Medications   Medication Sig Dispense Refill    ketoconazole (NIZORAL) 2 % shampoo Use as body wash leaving on for 5 minutes prior to washing off once daily for 2 weeks then three times weekly 120 mL 11    ARIPiprazole (ABILIFY) 5 MG tablet TAKE 1 TABLET BY MOUTH ONCE DAILY 90 tablet 2    rosuvastatin (CRESTOR) 5 MG tablet Take 1 tablet by mouth nightly 90 tablet 2    omeprazole (PRILOSEC) 40 MG delayed release capsule Take 1 capsule by mouth daily 90 capsule 2    omeprazole (PRILOSEC) 40 MG delayed release capsule TAKE 1 CAPSULE BY MOUTH ONCE DAILY 30 capsule 6    Urea (CARMOL) 40 % cream Apply under toenails nightly.   Use in conjunction with ketoconazole cream. 198 g 2    clindamycin (CLEOCIN T) 1 % lotion Apply to affected areas on feet daily 60 mL 11    ketoconazole (NIZORAL) 2 % cream Apply to affected area BID PRN flares 60 g 2    nicotine (NICODERM CQ) 21 MG/24HR Place 1 patch onto the skin every 24 hours 30 patch 3     No current facility-administered medications for this visit. ALLERGIES:   No Known Allergies    SOCIAL HISTORY:  Social History     Tobacco Use    Smoking status: Every Day     Packs/day: 1.00     Types: Cigarettes    Smokeless tobacco: Never    Tobacco comments:     down to 5 cigarettes a day 03/21/2016   Substance Use Topics    Alcohol use: Yes     Alcohol/week: 2.0 standard drinks     Types: 2 Cans of beer per week     Comment: cut back 3 months ago doesn't even drink daily now       Pertinent ROS:  Review of Systems  Skin: Denies any new changing, growing or bleeding lesions or rashes except as described in the HPI   Constitutional: Denies fevers, chills, and malaise. PHYSICAL EXAM:   /70 (Site: Right Upper Arm, Position: Sitting)   Pulse 76   Ht 6' 2\" (1.88 m)   Wt 194 lb (88 kg)   BMI 24.91 kg/m²     The patient is generally well appearing, well nourished, alert and conversational. Affect is normal.    Cutaneous Exam:  Physical Exam  Total body skin exam excluding external genitalia: head/face, neck, both arms, chest, back, abdomen, both legs, buttocks, digits and/or nails, was examined. Genital exam was deferred as patient denied having any lesions in this area. Complete visualization of scalp may be limited by hair density, length, and/or style    Facial covering was removed during examination. Diagnoses/exam findings/medical history pertinent to this visit are listed below:    Assessment:   Diagnosis Orders   1. Pitted keratolysis  clindamycin (CLEOCIN T) 1 % lotion      2. Tinea pedis of both feet  ketoconazole (NIZORAL) 2 % cream      3. Onychomycosis  Urea (CARMOL) 40 % cream    ketoconazole (NIZORAL) 2 % cream           Plan:  1. Pitted keratolysis  - stable chronic illness   - clindamycin (CLEOCIN T) 1 % lotion;  Apply to affected areas on feet daily  Dispense: 60 mL; Refill: 11    2. Tinea pedis of both feet  - ketoconazole (NIZORAL) 2 % cream; Apply to affected area BID PRN flares  Dispense: 60 g; Refill: 2    3. Onychomycosis  - Urea (CARMOL) 40 % cream; Apply under toenails nightly. Use in conjunction with ketoconazole cream.  Dispense: 198 g; Refill: 2  - ketoconazole (NIZORAL) 2 % cream; Apply to affected area BID PRN flares  Dispense: 60 g; Refill: 2      RTC 1Y    Future Appointments   Date Time Provider Murphy Damari   11/27/2023  1:15 PM MD Celina Finn [address] [place] [address]         There are no Patient Instructions on file for this visit.       Electronically signed by Dariela Smith PA-C on 11/28/22 at 2:21 PM EST

## 2023-11-27 DIAGNOSIS — B36.0 TINEA VERSICOLOR: ICD-10-CM

## 2023-11-27 NOTE — TELEPHONE ENCOUNTER
Karin Tobias called requesting a refill of the below medication which has been pended for you:     Requested Prescriptions     Pending Prescriptions Disp Refills    ketoconazole (NIZORAL) 2 % shampoo 120 mL 11     Sig: Use as body wash leaving on for 5 minutes prior to washing off once daily for 2 weeks then three times weekly       Last Appointment Date: 12/27/2021  Next Appointment Date: 4/22/24 with [facility]    No Known Allergies

## 2023-12-02 DIAGNOSIS — B36.0 TINEA VERSICOLOR: ICD-10-CM

## 2023-12-04 RX ORDER — KETOCONAZOLE 20 MG/ML
SHAMPOO TOPICAL
Qty: 120 ML | Refills: 5 | Status: SHIPPED | OUTPATIENT
Start: 2023-12-04

## 2023-12-04 RX ORDER — KETOCONAZOLE 20 MG/ML
SHAMPOO TOPICAL
Qty: 120 ML | Refills: 0 | OUTPATIENT
Start: 2023-12-04

## 2023-12-04 NOTE — TELEPHONE ENCOUNTER
Pt requesting refill. Pt missed appt in November and is rescheduled with Radha Locke in April.  Will you refill Nizoral?

## 2024-04-22 ENCOUNTER — OFFICE VISIT (OUTPATIENT)
Dept: DERMATOLOGY | Age: 44
End: 2024-04-22
Payer: COMMERCIAL

## 2024-04-22 VITALS
HEART RATE: 68 BPM | HEIGHT: 73 IN | SYSTOLIC BLOOD PRESSURE: 120 MMHG | BODY MASS INDEX: 25.18 KG/M2 | DIASTOLIC BLOOD PRESSURE: 72 MMHG | RESPIRATION RATE: 14 BRPM | WEIGHT: 190 LBS

## 2024-04-22 DIAGNOSIS — B35.1 ONYCHOMYCOSIS: ICD-10-CM

## 2024-04-22 DIAGNOSIS — B36.0 TINEA VERSICOLOR: ICD-10-CM

## 2024-04-22 DIAGNOSIS — B35.3 TINEA PEDIS OF BOTH FEET: ICD-10-CM

## 2024-04-22 DIAGNOSIS — L08.89 PITTED KERATOLYSIS: ICD-10-CM

## 2024-04-22 PROCEDURE — 99213 OFFICE O/P EST LOW 20 MIN: CPT | Performed by: PHYSICIAN ASSISTANT

## 2024-04-22 RX ORDER — KETOCONAZOLE 20 MG/ML
SHAMPOO TOPICAL
Qty: 120 ML | Refills: 5 | Status: SHIPPED | OUTPATIENT
Start: 2024-04-22

## 2024-04-22 RX ORDER — KETOCONAZOLE 20 MG/G
CREAM TOPICAL
Qty: 60 G | Refills: 3 | Status: SHIPPED | OUTPATIENT
Start: 2024-04-22

## 2024-04-22 RX ORDER — UREA 40 %
CREAM (GRAM) TOPICAL
Qty: 198 G | Refills: 2 | Status: SHIPPED | OUTPATIENT
Start: 2024-04-22

## 2024-04-22 RX ORDER — CLINDAMYCIN PHOSPHATE 10 UG/ML
LOTION TOPICAL
Qty: 120 ML | Refills: 11 | Status: SHIPPED | OUTPATIENT
Start: 2024-04-22

## 2024-04-22 NOTE — PATIENT INSTRUCTIONS
1. Pitted keratolysis  - stable chronic illness   - clindamycin (CLEOCIN T) 1 % lotion; Apply to affected areas on feet daily    - benzoyl peroxide 5% wash daily (or OTC alternative, options listed in AVS)     2. Tinea pedis of both feet  - ketoconazole (NIZORAL) 2 % cream; Apply to affected area BID PRN flares       3. Onychomycosis  - Urea (CARMOL) 40 % cream; Apply under toenails nightly.  Use in conjunction with ketoconazole cream  - ketoconazole (NIZORAL) 2 % cream; Apply to affected area BID PRN flares

## 2024-04-23 NOTE — PROGRESS NOTES
(PRILOSEC) 40 MG delayed release capsule TAKE 1 CAPSULE BY MOUTH ONCE DAILY 30 capsule 6    nicotine (NICODERM CQ) 21 MG/24HR Place 1 patch onto the skin every 24 hours 30 patch 3     No current facility-administered medications for this visit.       ALLERGIES:   No Known Allergies    SOCIAL HISTORY:  Social History     Tobacco Use    Smoking status: Every Day     Current packs/day: 1.00     Types: Cigarettes    Smokeless tobacco: Never    Tobacco comments:     down to 5 cigarettes a day 03/21/2016   Substance Use Topics    Alcohol use: Yes     Alcohol/week: 2.0 standard drinks of alcohol     Types: 2 Cans of beer per week     Comment: cut back 3 months ago doesn't even drink daily now       Pertinent ROS:  Review of Systems  Skin: Denies any new changing, growing or bleeding lesions or rashes except as described in the HPI   Constitutional: Denies fevers, chills, and malaise.    PHYSICAL EXAM:   There were no vitals taken for this visit.    The patient is generally well appearing, well nourished, alert and conversational. Affect is normal.    Cutaneous Exam:  Physical Exam  {Blank single:48471::\"Face and neck only was examined.\",\"Focused exam of *** was performed\",\"Acne exam: exam of face, neck, chest, and back was performed.\",\"Sun-exposed skin: head/face, neck, both arms, digits and nails were examined.\",\"Waist-up skin: the head/face,neck, both arms, chest, back, abdomen, digits and/or nails, was examined.\",\"Sun exposed + limited LEs: Head/face,neck, both arms, digits and/or nails and legs visible with pants/shorts and shoes/socks on was examined.\",\"Waist-up + limited LEs: Head/face,neck, both arms, chest, back, abdomen, digits and/or nails, and legs visible with pants/shorts and shoes/socks on was examined.\",\"Total body skin exam including external genitalia: head/face, neck, both arms, chest, back, abdomen, both legs, genitalia, buttocks, digits and/or nails, was examined. Complete visualization of scalp may be 
          Plan:  1. Pitted keratolysis  - stable chronic illness   - clindamycin (CLEOCIN T) 1 % lotion; Apply to affected areas on feet daily    - benzoyl peroxide 5% wash daily (or OTC alternative, options listed in AVS)     2. Tinea pedis of both feet  - ketoconazole (NIZORAL) 2 % cream; Apply to affected area BID PRN flares       3. Onychomycosis  - Urea (CARMOL) 40 % cream; Apply under toenails nightly.  Use in conjunction with ketoconazole cream  - ketoconazole (NIZORAL) 2 % cream; Apply to affected area BID PRN flares      4. Tinea versicolor  - stable chronic illness   - continue ketoconazole 2% shampoo to scalp 3-4 weekly, leave in 5 min prior to rinsing     RTC 1 year tinea    Future Appointments   Date Time Provider Department Center   4/28/2025  1:30 PM Yoandy Alva PA-C [facility]           Patient Instructions   1. Pitted keratolysis  - stable chronic illness   - clindamycin (CLEOCIN T) 1 % lotion; Apply to affected areas on feet daily    - benzoyl peroxide 5% wash daily (or OTC alternative, options listed in AVS)     2. Tinea pedis of both feet  - ketoconazole (NIZORAL) 2 % cream; Apply to affected area BID PRN flares       3. Onychomycosis  - Urea (CARMOL) 40 % cream; Apply under toenails nightly.  Use in conjunction with ketoconazole cream  - ketoconazole (NIZORAL) 2 % cream; Apply to affected area BID PRN flares        IArvind, personally scribed the services dictated to me by Yoandy Alva in this documentation.    .attest

## 2025-07-28 ENCOUNTER — OFFICE VISIT (OUTPATIENT)
Dept: DERMATOLOGY | Age: 45
End: 2025-07-28
Payer: COMMERCIAL

## 2025-07-28 VITALS
DIASTOLIC BLOOD PRESSURE: 72 MMHG | BODY MASS INDEX: 24.77 KG/M2 | SYSTOLIC BLOOD PRESSURE: 120 MMHG | WEIGHT: 193 LBS | HEART RATE: 78 BPM | RESPIRATION RATE: 12 BRPM | HEIGHT: 74 IN

## 2025-07-28 DIAGNOSIS — B36.0 TINEA VERSICOLOR: ICD-10-CM

## 2025-07-28 DIAGNOSIS — L08.89 PITTED KERATOLYSIS: Primary | ICD-10-CM

## 2025-07-28 DIAGNOSIS — B35.3 TINEA PEDIS OF BOTH FEET: ICD-10-CM

## 2025-07-28 PROCEDURE — 99213 OFFICE O/P EST LOW 20 MIN: CPT | Performed by: PHYSICIAN ASSISTANT

## 2025-07-28 RX ORDER — GABAPENTIN 300 MG/1
300 CAPSULE ORAL
COMMUNITY

## 2025-07-28 RX ORDER — HYDROXYZINE HYDROCHLORIDE 10 MG/1
TABLET, FILM COATED ORAL
COMMUNITY
End: 2025-07-28

## 2025-07-28 RX ORDER — KETOCONAZOLE 20 MG/ML
SHAMPOO, SUSPENSION TOPICAL
Qty: 120 ML | Refills: 10 | Status: SHIPPED | OUTPATIENT
Start: 2025-07-28

## 2025-07-28 RX ORDER — FLUCONAZOLE 150 MG/1
150 TABLET ORAL DAILY
Qty: 3 TABLET | Refills: 0 | Status: SHIPPED | OUTPATIENT
Start: 2025-07-28 | End: 2025-07-31

## 2025-07-28 NOTE — PROGRESS NOTES
Dermatology Patient Note  [facility] CARE, [facility]  [facility] DERM AND SKIN A DEPARTMENT OF [facility]  [address]  [facility] [address]  Dept: [phone]  Dept Fax: [phone]      VISITDATE: 7/28/2025   REFERRING PROVIDER: No ref. provider found      Drew Sidhu is a 44 y.o. male  who presents today in the office for:    keratolysis (One year)      HISTORY OF PRESENT ILLNESS:  Pt states that pitted keratolysis is doing well with BPO wash and clindamycin lotion.  Pt states that tinea versicolor was doing well with q weekly ketoconazole shampoo, but this has recurred since the pt ran out of the shampoo.  Pt also has recurrence of tinea in his last toe web space.  He admits that he has not been using ketoconazole cream.    MEDICAL PROBLEMS:  Patient Active Problem List    Diagnosis Date Noted    Hyperlipidemia LDL goal <130 02/17/2016    Exophthalmia 02/17/2016    GERD (gastroesophageal reflux disease)     Tobacco abuse     Tinea versicolor      recurrent      Anxiety state     Depressive disorder, not elsewhere classified     Alcohol abuse     History of marijuana use     History of hand fracture 05/01/2010     proximal shaft of right 5th metacarpal         CURRENT MEDICATIONS:   Current Outpatient Medications   Medication Sig Dispense Refill    FLUoxetine (PROZAC) 20 MG capsule Take 1 capsule by mouth daily      gabapentin (NEURONTIN) 300 MG capsule Take 1 capsule by mouth. daily      benzoyl peroxide 5 % external liquid Use to wash feet daily. 227 g 5    ketoconazole (NIZORAL) 2 % shampoo Apply to dry skin of upper back, leaving on for 5 minutes prior to washing.  Repeat weekly. 120 mL 10    fluconazole (DIFLUCAN) 150 MG tablet Take 1 tablet by mouth daily for 3 days 3 tablet 0    clindamycin (CLEOCIN T) 1 % lotion Apply to affected areas on feet (pitted areas) daily 120 mL 11    ketoconazole (NIZORAL) 2 % cream Apply under toenails,

## (undated) DEVICE — BITE BLOCK W/VELCRO STRAP

## (undated) DEVICE — LINE SAMP O2 6.5FT W/FEMALE CONN F/ADULT CAPNOLINE PLUS

## (undated) DEVICE — 1200CC GUARDIAN II: Brand: GUARDIAN

## (undated) DEVICE — CONNECTOR TBNG AUX H2O JET DISP FOR OLY 160/180 SER

## (undated) DEVICE — DISCONTINUED USE 419147 KIT ENDOSCOPY CUSTOM PACK